# Patient Record
Sex: FEMALE | Race: WHITE | Employment: FULL TIME | URBAN - METROPOLITAN AREA
[De-identification: names, ages, dates, MRNs, and addresses within clinical notes are randomized per-mention and may not be internally consistent; named-entity substitution may affect disease eponyms.]

---

## 2018-04-01 ENCOUNTER — HOSPITAL ENCOUNTER (EMERGENCY)
Facility: HOSPITAL | Age: 73
Discharge: STILL ADMITTED OR ADMINISTRATIVE | End: 2018-04-09

## 2024-03-21 ENCOUNTER — TELEPHONE (OUTPATIENT)
Facility: CLINIC | Age: 79
End: 2024-03-21

## 2024-03-21 ENCOUNTER — OFFICE VISIT (OUTPATIENT)
Facility: CLINIC | Age: 79
End: 2024-03-21
Payer: MEDICARE

## 2024-03-21 VITALS — WEIGHT: 140 LBS | HEIGHT: 61.5 IN | BODY MASS INDEX: 26.09 KG/M2

## 2024-03-21 DIAGNOSIS — G62.9 NEUROPATHY: Primary | ICD-10-CM

## 2024-03-21 PROCEDURE — 99204 OFFICE O/P NEW MOD 45 MIN: CPT | Performed by: NEUROLOGICAL SURGERY

## 2024-03-21 RX ORDER — METOPROLOL SUCCINATE 25 MG/1
25 TABLET, EXTENDED RELEASE ORAL 2 TIMES DAILY
COMMUNITY
Start: 2024-02-05

## 2024-03-21 RX ORDER — ROPINIROLE 1 MG/1
1 TABLET, FILM COATED ORAL EVERY MORNING
COMMUNITY

## 2024-03-21 RX ORDER — ESCITALOPRAM OXALATE 5 MG/1
5 TABLET ORAL DAILY
COMMUNITY
Start: 2024-02-05

## 2024-03-21 NOTE — TELEPHONE ENCOUNTER
Patient brought imaging from [facility] and [facility] to her appointment with Dr. Boles.    09/21/2023 - MRI Lumbar Spine at [facility]  03/10/2020 - MRI Lumbar Spine WOW at [facility]    Films uploaded to PACS.   Discs returned to the patient.

## 2024-03-21 NOTE — PROGRESS NOTES
New patient presents today complaining of  burning, numbness, pin and needles when lying down and during day from knee to calf. They symptoms start at the toes. Patient is using cold pack three times at night for pain relief. Patient states it is hard to sleep.     Any otc pain medication cause asthma    Blood pressure medications - anywhere from hives to sleep    Environmental allergies

## 2024-03-21 NOTE — H&P
CRISTINE COOPER M.D., F.A.A.N.S.     of Neurosurgery  [facility]  Board Certified Neurosurgeon                              [facility] Neurosurgery        [place] [facility]      [address]  [address] [place] [address]    PHONE  [phone]          FAX  [phone]    [email or web address]    [facility], [address], [place]     OFFICE CONSULTATION          Sophia Almanzar  : 10/25/1945   MRN: [id number]    PCP: MARZENNA SCHOENEICH, MD  Referring Provider: No ref. provider found     Insurance: Payor: MEDICARE / Plan: MEDICARE PART B ONLY / Product Type: *No Product type* /            Date of Consult:  3/21/2024    Reason for Consultation:  Our patient has been referred to our office for evaluation of: Bilateral lower extremity tingling and burning      History of Present Illness:  Sophia Almanzar is a a(n) right-handed, 78 year old, female.  This is a pleasant lady with history of lower back pain that has been treated in the past with exercises and multiple epidural steroid injections.  More recently, she started experiencing nightly burning and tingling of her lower extremities.  Reportedly, the sensation starts in her toes and often a sense towards her niece in a circumferential manner symmetrically in the lower extremities.  She denies any weakness or any sensory changes and the sensory perception in her lower extremities is not necessarily related to lower back pain.  She denies any acute bowel or bladder changes.        History:  Past Medical History:   Diagnosis Date    Allergic rhinitis     molds, dust, weed, feather    Essential hypertension     Thyroid disease       Past Surgical History:   Procedure Laterality Date    APPENDECTOMY  2009    CHOLECYSTECTOMY      TONSILLECTOMY Left 1993     Family History   Problem Relation Age of Onset    Ear Problems Other     Cancer Other     Thyroid  disease Other       Social History     Socioeconomic History    Marital status: Single     Spouse name: Not on file    Number of children: Not on file    Years of education: Not on file    Highest education level: Not on file   Occupational History    Not on file   Tobacco Use    Smoking status: Former    Smokeless tobacco: Never   Substance and Sexual Activity    Alcohol use: Yes     Comment: rarely    Drug use: No    Sexual activity: Not on file   Other Topics Concern    Caffeine Concern Not Asked    Exercise Not Asked    Seat Belt Not Asked    Special Diet Not Asked    Stress Concern Not Asked    Weight Concern Not Asked   Social History Narrative    Not on file     Social Determinants of Health     Financial Resource Strain: Not on file   Food Insecurity: Not on file   Transportation Needs: Not on file   Physical Activity: Not on file   Stress: Not on file   Social Connections: Not on file   Housing Stability: Not on file        Allergies:  No Known Allergies    Medications:  Current Outpatient Medications   Medication Sig Dispense Refill    escitalopram 5 MG Oral Tab Take 1 tablet (5 mg total) by mouth daily.      metoprolol succinate ER 25 MG Oral Tablet 24 Hr Take 1 tablet (25 mg total) by mouth 2 (two) times daily.      rOPINIRole 1 MG Oral Tab Take 1 tablet (1 mg total) by mouth every morning.      hydrochlorothiazide 25 MG Oral Tab TK 1 T PO QD  1    HYDROcodone-acetaminophen 5-325 MG Oral Tab TK 1 T PO Q 6 H PRN  0    ROPINIRole HCl 3 MG Oral Tab TK 1 T PO QD  0    Rosuvastatin Calcium 20 MG Oral Tab TK 1 T PO QD HS  0    VALTREX 1 g Oral Tab TK 1 T PO Q 8 H  5    mupirocin 2 % External Ointment Apply to affected area bid x 30 days 1 Tube 0        Review of Systems:  A 10-point system was reviewed.  Pertinent positives and negatives are noted in HPI.      Physical Exam:  Ht 61.5\"   Wt 140 lb (63.5 kg)   BMI 26.02 kg/m²        Neurological Exam:    Motor Strength:  5/5 AMG and symmetric    Sensation to  light touch:  Intact and symmetric in bilateral lower extremities    DTRs:  2+/4 in patellar distribution    Long tract signs:  Negative rosas  Negative babinski  Negative clonus      Abdomen:  Soft, non-distended, non-tender, with no rebound or guarding.  No peritoneal signs.     Extremities:  Non-tender, no lower extremity edema noted.      Labs:  CBC:  No results found for: \"WBC\", \"HGB\", \"HEMOGLOBIN\", \"HCT\", \"MCV\", \"PLT\"   BMG:   No results found for: \"GLUF\", \"NA\", \"K\", \"CO2\", \"CL\", \"BUN\", \"CREATININE\"   INR:   No results found for: \"INR\", \"PROTIME\"     Diagnostics:  I reviewed an MRI of the lumbar spine, dated 21 September, 2023.  There is diffuse advanced degenerative disc disease involving the entire thoracolumbar spine.  There is a rotatory coronal deformity causing multilevel lateral recess narrowing.  However, there is no significant central canal stenosis.     Diagnosis:  1. Neuropathy        Assessment/Plan:  Sophia Almanzar is a a(n) 78 year old, female, presents with bilateral lower extremity burning and tingling which appears to follow a nonradicular pattern.  In addition, there is no radiographic evidence of any severe lumbar stenosis that would endorse a diagnosis of neurogenic claudication.  I would like to obtain flexion-extension films of the lumbar spine to assess for any segmental instability.  In addition, we will obtain an electrophysiologic evaluation of the lower extremities to assess for radiculopathy.  Symptomatically, this appears to be more consistent with a peripheral neuropathy at this point in time.    All questions and concerns were addressed. We appreciate the opportunity to participate in the care of this patient. Please do not hesitate to call our office ([phone]) with any issues.   This report will be submitted to the referring provider.          Jesus Boles M.D., F.A.A.N.S.    3/21/2024  1:20 PM    This note has been dictated utilizing voice recognition software.  Unfortunately, this may lead to occasional typographical errors. If there are any questions regarding this, please do not hesitate to contact our office.

## 2024-03-25 ENCOUNTER — TELEPHONE (OUTPATIENT)
Facility: CLINIC | Age: 79
End: 2024-03-25

## 2024-03-25 ENCOUNTER — TELEPHONE (OUTPATIENT)
Dept: SURGERY | Facility: CLINIC | Age: 79
End: 2024-03-25

## 2024-03-25 NOTE — TELEPHONE ENCOUNTER
Patient brought in her ID cards of the stent information from [facility] eye doctor.  Stent is MRI compatible.  Sent to scan.

## 2024-03-25 NOTE — TELEPHONE ENCOUNTER
Patient brought imaging to her appointment with Dr. Boles.    09/21/2023 - MRI Lumbar SP WO Contrast  03/10/2020 - MRI Lumbar Spine WO Contrast    Films uploaded to PACS and returned to patient.

## 2024-04-02 ENCOUNTER — OFFICE VISIT (OUTPATIENT)
Dept: OTOLARYNGOLOGY | Facility: CLINIC | Age: 79
End: 2024-04-02

## 2024-04-02 VITALS — BODY MASS INDEX: 26.09 KG/M2 | HEIGHT: 61.5 IN | WEIGHT: 140 LBS

## 2024-04-02 DIAGNOSIS — R22.0 BUCCAL MASS: Primary | ICD-10-CM

## 2024-04-02 DIAGNOSIS — H92.03 OTALGIA, BILATERAL: ICD-10-CM

## 2024-04-02 DIAGNOSIS — T16.1XXA FOREIGN BODY OF RIGHT EAR, INITIAL ENCOUNTER: ICD-10-CM

## 2024-04-02 PROCEDURE — 99203 OFFICE O/P NEW LOW 30 MIN: CPT | Performed by: SPECIALIST

## 2024-04-02 PROCEDURE — 69200 CLEAR OUTER EAR CANAL: CPT | Performed by: SPECIALIST

## 2024-04-03 NOTE — PATIENT INSTRUCTIONS
I ordered a ultrasound of the area of your buccal mass.  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  Your cotton swab was removed from your right ear.  You had nasal crusting on your left nasal septum and can use some Neosporin or triple antibiotic ointment.  Try a boppy pillow to keep the pressure off your ears to prevent pain and ulceration of the pinna

## 2024-04-03 NOTE — PROGRESS NOTES
Sophia Almanzar is a 78 year old female.   Chief Complaint   Patient presents with    Mass     Pouch in cheek    Ear Problem     Bumps around ear     HPI:   Patient here to get her cheek and ears checked.    Current Outpatient Medications   Medication Sig Dispense Refill    escitalopram 5 MG Oral Tab Take 1 tablet (5 mg total) by mouth daily.      rOPINIRole 1 MG Oral Tab Take 1 tablet (1 mg total) by mouth every morning.      metoprolol succinate ER 25 MG Oral Tablet 24 Hr Take 1 tablet (25 mg total) by mouth 2 (two) times daily.      hydrochlorothiazide 25 MG Oral Tab TK 1 T PO QD  1    HYDROcodone-acetaminophen 5-325 MG Oral Tab TK 1 T PO Q 6 H PRN  0    ROPINIRole HCl 3 MG Oral Tab TK 1 T PO QD  0    Rosuvastatin Calcium 20 MG Oral Tab TK 1 T PO QD HS  0    VALTREX 1 g Oral Tab TK 1 T PO Q 8 H  5    mupirocin 2 % External Ointment Apply to affected area bid x 30 days 1 Tube 0      Past Medical History:   Diagnosis Date    Allergic rhinitis     molds, dust, weed, feather    Essential hypertension     Thyroid disease       Social History:  Social History     Socioeconomic History    Marital status: Single   Tobacco Use    Smoking status: Former    Smokeless tobacco: Never   Vaping Use    Vaping Use: Never used   Substance and Sexual Activity    Alcohol use: Yes     Comment: rarely    Drug use: No        REVIEW OF SYSTEMS:   GENERAL HEALTH: feels well otherwise  GENERAL : denies fever, chills, sweats, weight loss, weight gain  SKIN: denies any unusual skin lesions or rashes  RESPIRATORY: denies shortness of breath with exertion  NEURO: denies headaches    EXAM:   Ht 5' 1.5\" (1.562 m)   Wt 140 lb (63.5 kg)   BMI 26.02 kg/m²   System Details   Skin Inspection - Normal.   Constitutional Overall appearance - Normal.   Head/Face Facial features - Normal. Eyebrows - Normal. Skull - Normal.   Eyes Conjunctiva - Right: Normal, Left: Normal. Pupil - Right: Normal, Left: Normal.    Ears Inspection - Right: Normal, Left:  Normal.   Canal - Right: Broken cotton swab in the right external auditory canal.  Consent was obtained.  Using a microscope and alligator forceps the cotton swab was removed.  No complications.  Left: Normal.   TM - Right: Normal, Left: Normal.   Nasal External nose - Normal.   Nasal septum -left nasal septum deviation and crusting on the inferior aspect of it.  Turbinates - Normal.   Oral/Oropharynx Lips - Normal, Tonsils - Normal, Tongue - Normal    Neck Exam Inspection - Normal. Palpation - Normal. Parotid gland - Normal. Thyroid gland - Normal.   Lymph Detail Submental. Submandibular. Anterior cervical. Posterior cervical. Supraclavicular all without enlargement   Psychiatric Orientation - Oriented to time, place, person & situation. Appropriate mood and affect.   Neurological Memory - Normal. Cranial nerves - Cranial nerves II through XII grossly intact.     ASSESSMENT AND PLAN:   1. Buccal mass  Will get an ultrasound of the area to better evaluate  - US PAROTID (CPT=76536); Future    2. Foreign body of right ear, initial encounter  Removed.    3. Otalgia, bilateral  Likely from pressure on the pinnae.  Patient asked to try a boppy pillow.  I will of course call patient with all results.      The patient indicates understanding of these issues and agrees to the plan.      Sarah Restrepo MD  4/2/2024  9:25 PM

## 2024-04-04 ENCOUNTER — OFFICE VISIT (OUTPATIENT)
Facility: CLINIC | Age: 79
End: 2024-04-04
Payer: MEDICARE

## 2024-04-04 ENCOUNTER — TELEPHONE (OUTPATIENT)
Dept: OTOLARYNGOLOGY | Facility: CLINIC | Age: 79
End: 2024-04-04

## 2024-04-04 VITALS
HEART RATE: 68 BPM | SYSTOLIC BLOOD PRESSURE: 94 MMHG | WEIGHT: 140 LBS | BODY MASS INDEX: 26.09 KG/M2 | HEIGHT: 61.5 IN | DIASTOLIC BLOOD PRESSURE: 63 MMHG

## 2024-04-04 DIAGNOSIS — G62.9 NEUROPATHY: Primary | ICD-10-CM

## 2024-04-04 PROCEDURE — 99214 OFFICE O/P EST MOD 30 MIN: CPT

## 2024-04-04 RX ORDER — LATANOPROST 50 UG/ML
SOLUTION/ DROPS OPHTHALMIC
COMMUNITY
Start: 2022-03-10

## 2024-04-04 RX ORDER — FLUTICASONE FUROATE AND VILANTEROL 200; 25 UG/1; UG/1
1 POWDER RESPIRATORY (INHALATION) DAILY
COMMUNITY
Start: 2021-12-08

## 2024-04-04 RX ORDER — CIPROFLOXACIN HYDROCHLORIDE 3.5 MG/ML
1 SOLUTION/ DROPS TOPICAL 4 TIMES DAILY
COMMUNITY
Start: 2022-10-18

## 2024-04-04 RX ORDER — ALPRAZOLAM 0.25 MG/1
0.25 TABLET ORAL
COMMUNITY
Start: 2022-02-21

## 2024-04-04 RX ORDER — LEVALBUTEROL TARTRATE 45 UG/1
2 AEROSOL, METERED ORAL EVERY 6 HOURS
COMMUNITY
Start: 2022-01-12

## 2024-04-04 RX ORDER — PREDNISOLONE ACETATE 10 MG/ML
SUSPENSION/ DROPS OPHTHALMIC
COMMUNITY

## 2024-04-04 NOTE — TELEPHONE ENCOUNTER
Attempted to reach patient, LMTCB.      Order for ultrasound printed and placed at  for patient to , will also have patient bring back order that was given to her for wrong patient. Will attempt to call patient back,    MARÍA Bautista

## 2024-04-04 NOTE — TELEPHONE ENCOUNTER
Patient calling stating she was given the  wrong order for the ultrasound the order she has is from a different patient. Please advise

## 2024-04-04 NOTE — PROGRESS NOTES
[facility] Neurosurgery        [facility]      [address]  [address]  [place] [address]    PHONE  [phone]          FAX  [phone]    [email or web address]      OFFICE FOLLOW-UP NOTE            Sophia Almanzar    : 10/25/1945    MRN: [id number]  CSN: [id number]      PCP: MARZENNA SCHOENEICH, MD  Referring Provider: No ref. provider found    Insurance: Payor: MEDICARE / Plan: MEDICARE PART B ONLY / Product Type: *No Product type* /           Date of Visit: 2024    Reason for Visit:   Chief Complaint    Follow - Up                         History of Present Care:  Sophia Almanzar is a a(n) 78 year old, female who returns to the office for follow-up.  She has a history of low back pain was treated in the past with multiple epidural steroid injections.  She was seen approximately 2 weeks ago by Dr. Boles for burning and tingling at night in the toes which seems to radiate proximally.  She continues to have no changes in her bowel or bladder or saddle anesthesia.    History:  Past Medical History:   Diagnosis Date    Allergic rhinitis     molds, dust, weed, feather    Essential hypertension     Thyroid disease       Past Surgical History:   Procedure Laterality Date    APPENDECTOMY  2009    CHOLECYSTECTOMY      TONSILLECTOMY Left 1993      Family History   Problem Relation Age of Onset    Ear Problems Other     Cancer Other     Thyroid disease Other       Social History     Socioeconomic History    Marital status: Single     Spouse name: Not on file    Number of children: Not on file    Years of education: Not on file    Highest education level: Not on file   Occupational History    Not on file   Tobacco Use    Smoking status: Former    Smokeless tobacco: Never   Vaping Use    Vaping Use: Never used   Substance and Sexual Activity    Alcohol use: Yes     Comment: rarely    Drug use: No    Sexual activity: Not on file    Other Topics Concern    Caffeine Concern Not Asked    Exercise Not Asked    Seat Belt Not Asked    Special Diet Not Asked    Stress Concern Not Asked    Weight Concern Not Asked   Social History Narrative    Not on file     Social Determinants of Health     Financial Resource Strain: Not on file   Food Insecurity: Not on file   Transportation Needs: Not on file   Physical Activity: Not on file   Stress: Not on file   Social Connections: Not on file   Housing Stability: Not on file        Allergies:  Allergies   Allergen Reactions    Ace Inhibitors Coughing and WHEEZING     Wheezing, sob, headaches, cough, insomnia    Amlodipine SWELLING    Diltiazem SWELLING    Naproxen WHEEZING     Right side of neck swelling         Medications:  Current Outpatient Medications   Medication Sig Dispense Refill    ALPRAZolam 0.25 MG Oral Tab Take 1 tablet (0.25 mg total) by mouth daily as needed.      ciprofloxacin 0.3 % Ophthalmic Solution Place 1 drop into the right eye 4 (four) times daily.      fluticasone furoate-vilanterol (BREO ELLIPTA) 200-25 MCG/ACT Inhalation Aerosol Powder, Breath Activated Inhale 1 puff into the lungs daily.      latanoprost 0.005 % Ophthalmic Solution INSTILL 1 DROP IN BOTH EYES EVERY EVENING. SEPARATE BY AT LEAST 10 MINUTES FROM OTHER INTRACULAR PRESSURE REDUCINGOPHTHALMIC DRUGS      Levalbuterol Tartrate 45 MCG/ACT Inhalation Aerosol Inhale 2 puffs into the lungs every 6 (six) hours.      prednisoLONE 1 % Ophthalmic Suspension INSTILL 1 DROP IN BOTH EYES FOUR TIMES DAILY FOR 4 DAYS AFTER SURGERY      escitalopram 5 MG Oral Tab Take 1 tablet (5 mg total) by mouth daily.      rOPINIRole 1 MG Oral Tab Take 1 tablet (1 mg total) by mouth every morning.      metoprolol succinate ER 25 MG Oral Tablet 24 Hr Take 1 tablet (25 mg total) by mouth 2 (two) times daily.      hydrochlorothiazide 25 MG Oral Tab TK 1 T PO QD  1    HYDROcodone-acetaminophen 5-325 MG Oral Tab TK 1 T PO Q 6 H PRN  0    ROPINIRole HCl  3 MG Oral Tab TK 1 T PO QD  0    Rosuvastatin Calcium 20 MG Oral Tab TK 1 T PO QD HS  0    VALTREX 1 g Oral Tab TK 1 T PO Q 8 H  5    mupirocin 2 % External Ointment Apply to affected area bid x 30 days 1 Tube 0        Review of Systems:  A 10-point system was reviewed.  Pertinent positives and negatives are noted in HPI.      Physical Exam:  BP 94/63 (BP Location: Left arm, Patient Position: Sitting, Cuff Size: adult)   Pulse 68   Ht 61.5\"   Wt 140 lb (63.5 kg)   BMI 26.02 kg/m²         Neurological Exam:    AAOx3, following commands  PERRLA  EOMI  Face symmetrical  Tongue midline  Hearing symmetrical and intact to finger rub    No rhinorrhea or otorrhea    Romberg negative    Motor Strength:  5 out of 5 in bilateral lower extremities    Sensation:  Intact in bilateral lower extremities throughout    DTRs:  2+ out of 4 patellar and Achilles and symmetrical      Abdomen:  Soft, non-distended, non-tender, with no rebound or guarding.  No peritoneal signs.     Extremities:  Non-tender, no lower extremity edema noted.      Labs:  CBC:  No results found for: \"WBC\", \"HGB\", \"HEMOGLOBIN\", \"HCT\", \"MCV\", \"PLT\"   BMG:  No results found for: \"GLUF\", \"NA\", \"K\", \"CO2\", \"CL\", \"BUN\", \"CREATININE\"   INR:  No results found for: \"INR\", \"PROTIME\"       Diagnostics:  No new imaging to review    Diagnosis:  1. Neuropathy      Assessment/Plan:  Sophia Almanzar returns to the office for follow-up.  She was recently seen and ordered to have lumbar flexion-extension x-rays as well as an EMG of the lower extremities to evaluate for neuropathy versus lumbar radiculopathy.  She is now ready to have those studies completed and I have suggested that she schedule with central scheduling.  Will then plan to see patient back in the office for review of the studies.    More than 30 minutes were spent during this visit and the coordination of this patient's care. All questions and concerns were addressed. We appreciate the opportunity to  participate in the care of this patient. Please do not hesitate to call our office ([phone]) with any issues.    This note has been dictated utilizing voice recognition software. Unfortunately, this may lead to occasional typographical errors. If there are any questions regarding this, please do not hesitate to contact our office.       Santo Steele PA-C    4/4/2024  3:20 PM

## 2024-04-05 NOTE — TELEPHONE ENCOUNTER
Notified patient that we will be sending order in the mail due to her not being able to pick it up. Patient verbalized understanding.

## 2024-04-08 NOTE — TELEPHONE ENCOUNTER
Contacted patient and will bring back ultrasound order with incorrect name to our office as soon as she is able so we can shred this paperwork. Confirmed that correct new order was mailed 4/5/2024. Verbalized understanding.

## 2024-05-09 ENCOUNTER — OFFICE VISIT (OUTPATIENT)
Facility: CLINIC | Age: 79
End: 2024-05-09
Payer: MEDICARE

## 2024-05-09 ENCOUNTER — HOSPITAL ENCOUNTER (OUTPATIENT)
Dept: GENERAL RADIOLOGY | Age: 79
Discharge: HOME | End: 2024-05-09
Payer: MEDICARE

## 2024-05-09 VITALS
SYSTOLIC BLOOD PRESSURE: 144 MMHG | DIASTOLIC BLOOD PRESSURE: 78 MMHG | HEIGHT: 61.5 IN | WEIGHT: 140 LBS | HEART RATE: 68 BPM | BODY MASS INDEX: 26.09 KG/M2

## 2024-05-09 DIAGNOSIS — R20.2 PARESTHESIA OF BOTH LOWER EXTREMITIES: Primary | ICD-10-CM

## 2024-05-09 DIAGNOSIS — R20.2 PARESTHESIAS: ICD-10-CM

## 2024-05-09 PROCEDURE — 72120 X-RAY BEND ONLY L-S SPINE: CPT

## 2024-05-09 PROCEDURE — 99213 OFFICE O/P EST LOW 20 MIN: CPT | Performed by: NEUROLOGICAL SURGERY

## 2024-05-09 NOTE — PROGRESS NOTES
CRISTINE COOPER M.D., F.A.A.N.S.     of Neurosurgery  [facility]  Board Certified Neurosurgeon                          [facility], [facility], [facility]        [address]  [address] [place] [address]    PHONE  [phone]          FAX  [phone]    [email or web address]      OFFICE FOLLOW-UP NOTE      Sophia Almanzar    : 10/25/1945    MRN: [id number]  CSN: [id number]      PCP: MARZENNA SCHOENEICH, MD  Referring Provider: No ref. provider found    Insurance: Payor: MEDICARE / Plan: MEDICARE PART B ONLY / Product Type: *No Product type* /     Date of Visit: 2024    Reason for Visit:   Chief Complaint    Follow - Up                         History of Present Care:  Sophia Almanzar is a a(n) 78 year old, female. Sophia Almanzar is a a(n) right-handed, 78 year old, female.  This is a pleasant lady with history of lower back pain that has been treated in the past with exercises and multiple epidural steroid injections.  More recently, she started experiencing nightly burning and tingling of her lower extremities.  Reportedly, the sensation starts in her toes and often a sense towards her niece in a circumferential manner symmetrically in the lower extremities.  She denies any weakness or any sensory changes and the sensory perception in her lower extremities is not necessarily related to lower back pain.  She denies any acute bowel or bladder changes.         History:  .  Past Medical History:    Allergic rhinitis    molds, dust, weed, feather    Essential hypertension    Thyroid disease      Past Surgical History:   Procedure Laterality Date    Appendectomy  2009    Cholecystectomy      Tonsillectomy Left 1993      Family History   Problem Relation Age of Onset    Ear Problems Other     Cancer Other     Thyroid disease Other       Social History      Socioeconomic History    Marital status: Single     Spouse name: Not on file    Number of children: Not on file    Years of education: Not on file    Highest education level: Not on file   Occupational History    Not on file   Tobacco Use    Smoking status: Former    Smokeless tobacco: Never   Vaping Use    Vaping status: Never Used   Substance and Sexual Activity    Alcohol use: Yes     Comment: rarely    Drug use: No    Sexual activity: Not on file   Other Topics Concern    Caffeine Concern Not Asked    Exercise Not Asked    Seat Belt Not Asked    Special Diet Not Asked    Stress Concern Not Asked    Weight Concern Not Asked   Social History Narrative    Not on file     Social Determinants of Health     Financial Resource Strain: Not on file   Food Insecurity: Not on file   Transportation Needs: Not on file   Physical Activity: Not on file   Stress: Not on file   Social Connections: Not on file   Housing Stability: Not on file        Allergies:  Allergies   Allergen Reactions    Ace Inhibitors Coughing and WHEEZING     Wheezing, sob, headaches, cough, insomnia    Amlodipine SWELLING    Diltiazem SWELLING    Naproxen WHEEZING     Right side of neck swelling         Medications:  Current Outpatient Medications   Medication Sig Dispense Refill    ALPRAZolam 0.25 MG Oral Tab Take 1 tablet (0.25 mg total) by mouth daily as needed.      ciprofloxacin 0.3 % Ophthalmic Solution Place 1 drop into the right eye 4 (four) times daily.      fluticasone furoate-vilanterol (BREO ELLIPTA) 200-25 MCG/ACT Inhalation Aerosol Powder, Breath Activated Inhale 1 puff into the lungs daily.      latanoprost 0.005 % Ophthalmic Solution INSTILL 1 DROP IN BOTH EYES EVERY EVENING. SEPARATE BY AT LEAST 10 MINUTES FROM OTHER INTRACULAR PRESSURE REDUCINGOPHTHALMIC DRUGS      Levalbuterol Tartrate 45 MCG/ACT Inhalation Aerosol Inhale 2 puffs into the lungs every 6 (six) hours.      prednisoLONE 1 % Ophthalmic Suspension INSTILL 1 DROP IN BOTH  EYES FOUR TIMES DAILY FOR 4 DAYS AFTER SURGERY      escitalopram 5 MG Oral Tab Take 1 tablet (5 mg total) by mouth daily.      rOPINIRole 1 MG Oral Tab Take 1 tablet (1 mg total) by mouth every morning.      metoprolol succinate ER 25 MG Oral Tablet 24 Hr Take 1 tablet (25 mg total) by mouth 2 (two) times daily.      hydrochlorothiazide 25 MG Oral Tab TK 1 T PO QD  1    HYDROcodone-acetaminophen 5-325 MG Oral Tab TK 1 T PO Q 6 H PRN  0    ROPINIRole HCl 3 MG Oral Tab TK 1 T PO QD  0    Rosuvastatin Calcium 20 MG Oral Tab TK 1 T PO QD HS  0    VALTREX 1 g Oral Tab TK 1 T PO Q 8 H  5    mupirocin 2 % External Ointment Apply to affected area bid x 30 days 1 Tube 0        Review of Systems:  A 10-point system was reviewed.  Pertinent positives and negatives are noted in HPI.      Physical Exam:  /78 (BP Location: Left arm, Patient Position: Sitting, Cuff Size: adult)   Pulse 68   Ht 61.5\"   Wt 140 lb (63.5 kg)   BMI 26.02 kg/m²         Neurological Exam:    AAOx3, following commands  PERRLA  EOMI  Face symmetrical  Tongue midline  Hearing symmetrical and intact to finger rub    No rhinorrhea or otorrhea    Romberg negative    Motor Strength:  Symmetric in the lower extremities    Sensation to light touch:  Bilaterally symmetrical in the lower extremities    Incision:  No incision    Abdomen:  Soft, non-distended, non-tender, with no rebound or guarding.  No peritoneal signs.     Extremities:  Non-tender, no lower extremity edema noted.      Labs:  CBC:  No results found for: \"WBC\", \"HGB\", \"HEMOGLOBIN\", \"HCT\", \"MCV\", \"PLT\"   BMG:  No results found for: \"GLUF\", \"NA\", \"K\", \"CO2\", \"CL\", \"BUN\", \"CREATININE\"   INR:  No results found for: \"INR\", \"PROTIME\"       Diagnostics:  No new images to review    Diagnosis:  1. Paresthesia of both lower extremities  - EMG; Future  - X-RAY LUMBAR SPINE FLEX/EXTEN; Future      Assessment/Plan:  Our patient has not yet obtained the diagnostic studies with ordered at the last  encounter.  Will obtain flexion-extension films of the lumbar spine to assess for any dynamic pathology.  Furthermore, would like to evaluate her lower extremities for radiculopathy versus peripheral neuropathy to see if there is a surgical lesion that I can be helpful with.    More than 30 minutes were spent during this visit and the coordination of this patient's care. All questions and concerns were addressed. We appreciate the opportunity to participate in the care of this patient. Please do not hesitate to call our office ([phone]) with any issues.        Jesus Boles M.D., F.A.A.N.S.    5/9/2024  12:01 PM    This note has been dictated utilizing voice recognition software. Unfortunately, this may lead to occasional typographical errors. If there are any questions regarding this, please do not hesitate to contact our office.

## 2024-06-05 ENCOUNTER — HOSPITAL ENCOUNTER (OUTPATIENT)
Dept: ULTRASOUND IMAGING | Facility: HOSPITAL | Age: 79
Discharge: HOME | End: 2024-06-05
Attending: SPECIALIST
Payer: MEDICARE

## 2024-06-05 ENCOUNTER — TELEPHONE (OUTPATIENT)
Dept: OTOLARYNGOLOGY | Facility: CLINIC | Age: 79
End: 2024-06-05

## 2024-06-05 DIAGNOSIS — R22.0 BUCCAL MASS: Primary | ICD-10-CM

## 2024-06-05 DIAGNOSIS — R22.0 BUCCAL MASS: ICD-10-CM

## 2024-06-05 PROCEDURE — 76536 US EXAM OF HEAD AND NECK: CPT | Performed by: SPECIALIST

## 2024-06-05 NOTE — TELEPHONE ENCOUNTER
Left a message for the patient.  The ultrasound wasn't helpful.  I will get a CT of the neck to better characterize the mass.

## 2024-06-12 ENCOUNTER — HOSPITAL ENCOUNTER (OUTPATIENT)
Dept: CT IMAGING | Facility: HOSPITAL | Age: 79
Discharge: HOME | End: 2024-06-12
Attending: SPECIALIST
Payer: MEDICARE

## 2024-06-12 DIAGNOSIS — R22.0 BUCCAL MASS: ICD-10-CM

## 2024-06-12 LAB
CREAT BLD-MCNC: 0.7 MG/DL
EGFRCR SERPLBLD CKD-EPI 2021: 89 ML/MIN/1.73M2 (ref 60–?)

## 2024-06-12 PROCEDURE — 82565 ASSAY OF CREATININE: CPT

## 2024-06-12 PROCEDURE — 70491 CT SOFT TISSUE NECK W/DYE: CPT | Performed by: SPECIALIST

## 2024-06-13 ENCOUNTER — TELEPHONE (OUTPATIENT)
Dept: OTOLARYNGOLOGY | Facility: CLINIC | Age: 79
End: 2024-06-13

## 2024-06-13 NOTE — TELEPHONE ENCOUNTER
Per patient received CT results call from Dr. Restrepo, was told to make follow up appointment to discuss further, patient scheduled next available on 7/2 at [facility], patient asking if ok to wait that long? States most convenient location is [facility]. Please advise thank you.

## 2024-06-13 NOTE — PROGRESS NOTES
I left a message for the patient.  There looks to be a left vocal cord paralysis, possibly secondary to the past thyroid surgery.  There is also a possible left subclavian steal syndrome.  I would ask you to comment and possibly work this up if needed.  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  The CT scan is a little hard to see in this area secondary to dental artifact.  I did leave a message for the patient asking her to follow-up with me.

## 2024-06-13 NOTE — TELEPHONE ENCOUNTER
Yes, there isn't anything acute.  I have also sent the information to Dr. Gonzalez. The patient may want to reach out to her as well.  I would like to recheck her on 7/2.

## 2024-06-13 NOTE — TELEPHONE ENCOUNTER
Dr. Restrepo, patient is to follow up with you and discuss CT results, she prefers the [facility] office and the next appt there is 7/2/24, is that ok or do you need to see her sooner?

## 2024-06-14 NOTE — TELEPHONE ENCOUNTER
Called Sophia to let her know the following info from Dr. Restrepo:    It's ok to see patient on 7/2/24, there's nothing acute. She also sent the information to Dr. Gonzalez PCP. The patient may want to reach out to her as well. She would like to recheck Sophia on 7/2.     Sophia understood.

## 2024-07-02 ENCOUNTER — OFFICE VISIT (OUTPATIENT)
Dept: OTOLARYNGOLOGY | Facility: CLINIC | Age: 79
End: 2024-07-02

## 2024-07-02 VITALS — BODY MASS INDEX: 26.09 KG/M2 | WEIGHT: 140 LBS | HEIGHT: 61.5 IN

## 2024-07-02 DIAGNOSIS — J38.01 UNILATERAL VOCAL CORD PARALYSIS: ICD-10-CM

## 2024-07-02 DIAGNOSIS — E04.2 MULTIPLE THYROID NODULES: Primary | ICD-10-CM

## 2024-07-02 DIAGNOSIS — R22.0 BUCCAL MASS: ICD-10-CM

## 2024-07-02 DIAGNOSIS — G45.8 SUBCLAVIAN STEAL SYNDROME: ICD-10-CM

## 2024-07-02 PROCEDURE — 31575 DIAGNOSTIC LARYNGOSCOPY: CPT | Performed by: SPECIALIST

## 2024-07-02 PROCEDURE — 99214 OFFICE O/P EST MOD 30 MIN: CPT | Performed by: SPECIALIST

## 2024-07-03 NOTE — PATIENT INSTRUCTIONS
You have a left vocal cord paralysis.  A repeat thyroid ultrasound was ordered to check on the stability of your right vocal cord nodules.  Please get me a copy of the old disc so I can upload that into the system.  We reviewed the CT scan which shows there just to be some asymmetric fat but no other abnormalities of your buccal area.

## 2024-07-03 NOTE — PROGRESS NOTES
Sophia Almanzar is a 78 year old female.   Chief Complaint   Patient presents with    Results     Discuss results     HPI:   Patient here to discuss her CT scan results.    Current Outpatient Medications   Medication Sig Dispense Refill    ALPRAZolam 0.25 MG Oral Tab Take 1 tablet (0.25 mg total) by mouth daily as needed.      ciprofloxacin 0.3 % Ophthalmic Solution Place 1 drop into the right eye 4 (four) times daily.      fluticasone furoate-vilanterol (BREO ELLIPTA) 200-25 MCG/ACT Inhalation Aerosol Powder, Breath Activated Inhale 1 puff into the lungs daily.      latanoprost 0.005 % Ophthalmic Solution INSTILL 1 DROP IN BOTH EYES EVERY EVENING. SEPARATE BY AT LEAST 10 MINUTES FROM OTHER INTRACULAR PRESSURE REDUCINGOPHTHALMIC DRUGS      Levalbuterol Tartrate 45 MCG/ACT Inhalation Aerosol Inhale 2 puffs into the lungs every 6 (six) hours.      prednisoLONE 1 % Ophthalmic Suspension INSTILL 1 DROP IN BOTH EYES FOUR TIMES DAILY FOR 4 DAYS AFTER SURGERY      escitalopram 5 MG Oral Tab Take 1 tablet (5 mg total) by mouth daily.      rOPINIRole 1 MG Oral Tab Take 1 tablet (1 mg total) by mouth every morning.      metoprolol succinate ER 25 MG Oral Tablet 24 Hr Take 1 tablet (25 mg total) by mouth 2 (two) times daily.      hydrochlorothiazide 25 MG Oral Tab TK 1 T PO QD  1    HYDROcodone-acetaminophen 5-325 MG Oral Tab TK 1 T PO Q 6 H PRN  0    ROPINIRole HCl 3 MG Oral Tab TK 1 T PO QD  0    Rosuvastatin Calcium 20 MG Oral Tab TK 1 T PO QD HS  0    VALTREX 1 g Oral Tab TK 1 T PO Q 8 H  5    mupirocin 2 % External Ointment Apply to affected area bid x 30 days 1 Tube 0      Past Medical History:    Allergic rhinitis    molds, dust, weed, feather    Essential hypertension    Thyroid disease      Social History:  Social History     Socioeconomic History    Marital status:    Tobacco Use    Smoking status: Former    Smokeless tobacco: Never   Vaping Use    Vaping status: Never Used   Substance and Sexual Activity     Alcohol use: Yes     Comment: rarely    Drug use: No        REVIEW OF SYSTEMS:   GENERAL HEALTH: feels well otherwise  GENERAL : denies fever, chills, sweats, weight loss, weight gain  SKIN: denies any unusual skin lesions or rashes  RESPIRATORY: denies shortness of breath with exertion  NEURO: denies headaches    EXAM:   Ht 5' 1.5\" (1.562 m)   Wt 140 lb (63.5 kg)   BMI 26.02 kg/m²   System Details   Skin Inspection - Normal.   Constitutional Overall appearance - Normal.   Head/Face Facial features - Normal. Eyebrows - Normal. Skull - Normal..  Some asymmetry of the right buccal versus left buccal area.   Eyes Conjunctiva - Right: Normal, Left: Normal. Pupil - Right: Normal, Left: Normal.    Ears Inspection - Right: Normal, Left: Normal.   Canal - Right: Normal, Left: Normal.    TM - Right: Normal, Left: Normal.   Nasal External nose - Normal.   Nasal septum - Normal.  Turbinates - Normal   Oral/Oropharynx Lips - Normal, Tonsils - Normal, Tongue - Normal    Neck Exam Inspection - Normal. Palpation - Normal. Parotid gland - Normal. Thyroid gland -no palpable thyroid nodules   Lymph Detail Submental. Submandibular. Anterior cervical. Posterior cervical. Supraclavicular.  All without enlargement   Psychiatric Orientation - Oriented to time, place, person & situation. Appropriate mood and affect.   Neurological Memory - Normal. Cranial nerves - Cranial nerves II through XII grossly intact.   Nasopharynx Normal by fiberoptic exam   Larynx Consent was obtained for the procedure.  The nose was asesthetized with 1% neosynephrine and 4% lidocaine topical drops.    The scope was placed into the bilateral nares as well as the nasopharynx, hypopharynx and larynx.    All of which were examined.  The  entire larynx including the vallecula, epiglottis, true and false vocal cords, aryepiglottic folds, piriform sinuses and post cricord area were examined for tumors and infectious processes as well as for evidence of reflux and  retained secretions.  Vocal cord mobility was also assessed.    Any abnormalities are noted in the exam section.  Left true vocal cord paralysis.  Some compensation by the right true vocal cord.  Contain secretions.  No tumors or masses seen.     ASSESSMENT AND PLAN:   1. Multiple thyroid nodules  As last thyroid ultrasound was more than a year ago, would recommend repeat thyroid ultrasound.  Dominant right thyroid nodule had a fine-needle aspirate done which was negative a couple years ago.  Past thyroid ultrasound as well as fine-needle aspirate reviewed with the patient  - US THYROID (CPT=76536); Future    2. Unilateral vocal cord paralysis  On the left    3. Buccal mass  Reviewed actual CT scan with the patient and this just showed some asymmetry of the fat but no other abnormalities.  I believe she is biting her buccal area due to some missing teeth in the back on the right.    4.  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 Gonzalez.      The patient indicates understanding of these issues and agrees to the plan.      Sarah Restrepo MD  7/2/2024  7:53 PM

## 2024-07-29 ENCOUNTER — PROCEDURE VISIT (OUTPATIENT)
Dept: PHYSICAL MEDICINE AND REHAB | Facility: CLINIC | Age: 79
End: 2024-07-29
Payer: MEDICARE

## 2024-07-29 DIAGNOSIS — R20.2 PARESTHESIAS: Primary | ICD-10-CM

## 2024-07-29 NOTE — PROGRESS NOTES
[facility]  Electromyography Consultation      History of Present Illness:    Dear Dr. Boles,  Thank you for the opportunity to see Sophia Almanzar for electrodiagnostic consultation today. As you know the patient is a 78 year old female with a chief complaint of symmetric paresthesias from the knees down.  She also has chronic low back pain.    PAST MEDICAL HISTORY:  Past Medical History:    Allergic rhinitis    molds, dust, weed, feather    Essential hypertension    Thyroid disease       SURGICAL HISTORY:  Past Surgical History:   Procedure Laterality Date    Appendectomy  09/2009    Cholecystectomy      Tonsillectomy Left 03/1993       SOCIAL HISTORY:   Social History     Occupational History    Not on file   Tobacco Use    Smoking status: Former    Smokeless tobacco: Never   Vaping Use    Vaping status: Never Used   Substance and Sexual Activity    Alcohol use: Yes     Comment: rarely    Drug use: No    Sexual activity: Not on file       FAMILY HISTORY:   Family History   Problem Relation Age of Onset    Ear Problems Other     Cancer Other     Thyroid disease Other        CURRENT MEDICATIONS:   Current Outpatient Medications   Medication Sig Dispense Refill    ALPRAZolam 0.25 MG Oral Tab Take 1 tablet (0.25 mg total) by mouth daily as needed.      ciprofloxacin 0.3 % Ophthalmic Solution Place 1 drop into the right eye 4 (four) times daily.      fluticasone furoate-vilanterol (BREO ELLIPTA) 200-25 MCG/ACT Inhalation Aerosol Powder, Breath Activated Inhale 1 puff into the lungs daily.      latanoprost 0.005 % Ophthalmic Solution INSTILL 1 DROP IN BOTH EYES EVERY EVENING. SEPARATE BY AT LEAST 10 MINUTES FROM OTHER INTRACULAR PRESSURE REDUCINGOPHTHALMIC DRUGS      Levalbuterol Tartrate 45 MCG/ACT Inhalation Aerosol Inhale 2 puffs into the lungs every 6 (six) hours.      prednisoLONE 1 % Ophthalmic Suspension INSTILL 1 DROP IN BOTH EYES FOUR TIMES DAILY FOR 4 DAYS AFTER  SURGERY      escitalopram 5 MG Oral Tab Take 1 tablet (5 mg total) by mouth daily.      rOPINIRole 1 MG Oral Tab Take 1 tablet (1 mg total) by mouth every morning.      metoprolol succinate ER 25 MG Oral Tablet 24 Hr Take 1 tablet (25 mg total) by mouth 2 (two) times daily.      hydrochlorothiazide 25 MG Oral Tab TK 1 T PO QD  1    HYDROcodone-acetaminophen 5-325 MG Oral Tab TK 1 T PO Q 6 H PRN  0    ROPINIRole HCl 3 MG Oral Tab TK 1 T PO QD  0    Rosuvastatin Calcium 20 MG Oral Tab TK 1 T PO QD HS  0    VALTREX 1 g Oral Tab TK 1 T PO Q 8 H  5    mupirocin 2 % External Ointment Apply to affected area bid x 30 days 1 Tube 0       PHYSICAL EXAM:   There were no vitals taken for this visit.    There is no height or weight on file to calculate BMI.      General: No immediate distress   Extremities: peripheral pulses intact, no lower extremity edema bilaterally   Skin: No lesions noted.   Neuro:   Strength: Lower extremities have 5/5 strength  Muscle bulk: No atrophy  Sensation: Normal lower extremities  SLR: Negative bilaterally    EMG/NCV  Sensory Nerve Conduction Study       Nerve / Sites Peak Lat Amp Segments Distance    ms µV  cm   R Sural - (Antidromic)      Calf 3.4 10.4 Calf - Ankle 14   L Sural - (Antidromic)      Calf 4.1 9.6 Calf - Ankle 14       Motor Nerve Conduction Study       Nerve / Sites Latency Amplitude Rel Amp Dur. Dur. Segments Distance Velocity Area Area    ms mV % ms %  cm m/s mVms %   L Peroneal - EDB      Ankle 3.8 2.2 100 5.1 100 Ankle - EDB 8  5.5 100      B. Fib Head 9.7 2.0 91.3 4.2 82 B. Fib Head - Ankle 26 44 4.2 76.9   R Peroneal - EDB      Ankle 3.4 2.6 100 5.8 100 Ankle - EDB 8  7.3 100      B. Fib Head 9.6 2.5 99.4 6.5 112 B. Fib Head - Ankle 27.5 45 7.7 105       EMG Summary Table     Spontaneous MUAP Recruitment   Muscle IA Fib PSW Fasc Comments Amp Dur. PPP Pattern   R. Vastus lateralis N None None None None N N N N   R. Tibialis anterior N None None None None N N N N   R. Peroneus  longus N None None None None N N N N   R. Gastrocnemius (Medial head) N None None None None N N N N   R. Dorsal interossei (pedis) N None None None None N N N N   L. Vastus lateralis N None None None None N N N N   L. Tibialis anterior N None None None None N N N N   L. Peroneus longus N None None None None N N N N   L. Gastrocnemius (Medial head) N None None None None N N N N   L. Dorsal interossei (pedis) N None None None None N N N N               Findings: Extremities were warmed with hot packs for 15 minutes prior to testing.  Sensory nerve conduction studies revealed normal and bilaterally symmetric sural sensory responses.  Motor nerve conduction studies revealed normal and bilaterally symmetric common peroneal motor responses to the EDB.  Needle EMG of the bilateral lower extremities was normal in all muscles tested.  Please refer to EMG summary table above.  Impression:  1.  Normal study.  2.  No electrodiagnostic evidence of lumbar radiculopathy bilaterally.  3.  No electrodiagnostic evidence of peripheral polyneuropathy.      ASSESSMENT AND PLAN:  1. Paresthesias  The patient's EMG is negative        Thank you for the opportunity to participate in the care of this patient.  Sincerely,    Dane Bui M.D.  Diplomate American Board of Physical Medicine and Rehabilitation

## 2024-09-13 ENCOUNTER — HOSPITAL ENCOUNTER (OUTPATIENT)
Dept: ULTRASOUND IMAGING | Age: 79
Discharge: HOME | End: 2024-09-13
Attending: SPECIALIST
Payer: MEDICARE

## 2024-09-13 ENCOUNTER — TELEPHONE (OUTPATIENT)
Dept: OTOLARYNGOLOGY | Facility: CLINIC | Age: 79
End: 2024-09-13

## 2024-09-13 DIAGNOSIS — E04.2 MULTIPLE THYROID NODULES: ICD-10-CM

## 2024-09-13 PROCEDURE — 76536 US EXAM OF HEAD AND NECK: CPT | Performed by: SPECIALIST

## 2024-09-13 NOTE — PROGRESS NOTES
For your review.  Would recommend FNA if not already done.  I cannot find any documentation of this in care everywhere.

## 2024-09-14 ENCOUNTER — TELEPHONE (OUTPATIENT)
Dept: OTOLARYNGOLOGY | Facility: CLINIC | Age: 79
End: 2024-09-14

## 2024-09-19 ENCOUNTER — OFFICE VISIT (OUTPATIENT)
Facility: CLINIC | Age: 79
End: 2024-09-19
Payer: MEDICARE

## 2024-09-19 DIAGNOSIS — R20.2 PARESTHESIA OF BOTH LOWER EXTREMITIES: Primary | ICD-10-CM

## 2024-09-19 PROCEDURE — 99214 OFFICE O/P EST MOD 30 MIN: CPT | Performed by: NEUROLOGICAL SURGERY

## 2024-09-19 NOTE — PROGRESS NOTES
CRISTINE COOPER M.D., F.A.A.N.S.     of Neurosurgery  [facility]  Board Certified Neurosurgeon                          [facility], [facility], [facility]        [address]  [address] [place] [address]    PHONE  [phone]          FAX  [phone]    [email or web address]      OFFICE FOLLOW-UP NOTE      Sophia Almanzar    : 10/25/1945    MRN: [id number]  CSN: [id number]      PCP: SOPHIA DICKERSON MD  Referring Provider: No ref. provider found    Insurance: Payor: MEDICARE / Plan: MEDICARE PART B ONLY / Product Type: *No Product type* /     Date of Visit: 2024    Reason for Visit:   Chief Complaint    Follow - Up                         History of Present Care:  Sophia Almanzar is a a(n) 78 year old, female.  Our patient is following up today after recent electrophysiologic evaluation as well as dynamic imaging of the lumbar spine.  She has mechanical lower back pain with intermittent lower extremity symptoms associated with severe multilevel degenerative disc disease.  There was no evidence of any significant stenosis that would have made me recommend surgery at our last setting.  She is following up today.      History:  .  Past Medical History:    Allergic rhinitis    molds, dust, weed, feather    Essential hypertension    Thyroid disease      Past Surgical History:   Procedure Laterality Date    Appendectomy  2009    Cholecystectomy      Tonsillectomy Left 1993      Family History   Problem Relation Age of Onset    Ear Problems Other     Cancer Other     Thyroid disease Other       Social History     Socioeconomic History    Marital status:      Spouse name: Not on file    Number of children: Not on file    Years of education: Not on file    Highest education level: Not on file   Occupational History    Not on file   Tobacco Use     Smoking status: Former    Smokeless tobacco: Never   Vaping Use    Vaping status: Never Used   Substance and Sexual Activity    Alcohol use: Yes     Comment: rarely    Drug use: No    Sexual activity: Not on file   Other Topics Concern    Caffeine Concern Not Asked    Exercise Not Asked    Seat Belt Not Asked    Special Diet Not Asked    Stress Concern Not Asked    Weight Concern Not Asked   Social History Narrative    Not on file     Social Determinants of Health     Financial Resource Strain: Not on file   Food Insecurity: Not on file   Transportation Needs: Not on file   Physical Activity: Not on file   Stress: Not on file   Social Connections: Not on file   Housing Stability: Not on file        Allergies:  Allergies   Allergen Reactions    Ace Inhibitors Coughing and WHEEZING     Wheezing, sob, headaches, cough, insomnia    Amlodipine SWELLING    Diltiazem SWELLING    Naproxen WHEEZING     Right side of neck swelling         Medications:  Current Outpatient Medications   Medication Sig Dispense Refill    ALPRAZolam 0.25 MG Oral Tab Take 1 tablet (0.25 mg total) by mouth daily as needed.      ciprofloxacin 0.3 % Ophthalmic Solution Place 1 drop into the right eye 4 (four) times daily.      fluticasone furoate-vilanterol (BREO ELLIPTA) 200-25 MCG/ACT Inhalation Aerosol Powder, Breath Activated Inhale 1 puff into the lungs daily.      latanoprost 0.005 % Ophthalmic Solution INSTILL 1 DROP IN BOTH EYES EVERY EVENING. SEPARATE BY AT LEAST 10 MINUTES FROM OTHER INTRACULAR PRESSURE REDUCINGOPHTHALMIC DRUGS      Levalbuterol Tartrate 45 MCG/ACT Inhalation Aerosol Inhale 2 puffs into the lungs every 6 (six) hours.      prednisoLONE 1 % Ophthalmic Suspension INSTILL 1 DROP IN BOTH EYES FOUR TIMES DAILY FOR 4 DAYS AFTER SURGERY      escitalopram 5 MG Oral Tab Take 1 tablet (5 mg total) by mouth daily.      rOPINIRole 1 MG Oral Tab Take 1 tablet (1 mg total) by mouth every morning.      metoprolol succinate ER 25 MG Oral  Tablet 24 Hr Take 1 tablet (25 mg total) by mouth 2 (two) times daily.      hydrochlorothiazide 25 MG Oral Tab TK 1 T PO QD  1    HYDROcodone-acetaminophen 5-325 MG Oral Tab TK 1 T PO Q 6 H PRN  0    ROPINIRole HCl 3 MG Oral Tab TK 1 T PO QD  0    Rosuvastatin Calcium 20 MG Oral Tab TK 1 T PO QD HS  0    VALTREX 1 g Oral Tab TK 1 T PO Q 8 H  5    mupirocin 2 % External Ointment Apply to affected area bid x 30 days 1 Tube 0        Review of Systems:  A 10-point system was reviewed.  Pertinent positives and negatives are noted in HPI.      Physical Exam:  There were no vitals taken for this visit.        Neurological Exam:    AAOx3, following commands  PERRLA  EOMI  Face symmetrical  Tongue midline  Hearing symmetrical and intact to finger rub    No rhinorrhea or otorrhea    Romberg negative    Motor Strength:  Symmetrical in the lower extremities    Sensation to light touch:  Symmetrical in the lower extremities    Incision:  No incision    Abdomen:  Soft, non-distended, non-tender, with no rebound or guarding.  No peritoneal signs.     Extremities:  Non-tender, no lower extremity edema noted.      Labs:  CBC:  No results found for: \"WBC\", \"HGB\", \"HEMOGLOBIN\", \"HCT\", \"MCV\", \"PLT\"   BMG:  No results found for: \"GLUF\", \"CALCIUM\", \"NA\", \"K\", \"CO2\", \"CL\", \"BUN\", \"CREATININE\"   INR:  No results found for: \"INR\", \"PROTIME\"       Diagnostics:  I reviewed an x-ray of the lumbar spine with flexion and extension images.  The dynamic imaging does not demonstrate any evidence of overt instability in the setting of severe advanced degenerative disc disease affecting the entire lumbosacral spine.    A recent electrophysiologic evaluation was reviewed.  No evidence of peripheral neuropathy and no evidence of lumbosacral radiculopathy.    Diagnosis:  1. Paresthesia of both lower extremities      Assessment/Plan:  Our patient has mechanical lower back and lower extremity symptoms.  I do not believe that she is a surgical candidate in  my hands at this time.  The most appropriate recommendation would be to follow-up with the pain service to assess the option of neuromodulation.  A dorsal column stimulator trial can be performed in the office and if successful, we could implanted at the outpatient surgery center if she desires to do so.  She may follow-up with us depending on the outcome of the trial.    More than 30 minutes were spent during this visit and the coordination of this patient's care. All questions and concerns were addressed. We appreciate the opportunity to participate in the care of this patient. Please do not hesitate to call our office ([phone]) with any issues.        Jesus Boles M.D., F.A.A.N.S.    9/19/2024  1:49 PM    This note has been dictated utilizing voice recognition software. Unfortunately, this may lead to occasional typographical errors. If there are any questions regarding this, please do not hesitate to contact our office.

## 2024-10-08 ENCOUNTER — OFFICE VISIT (OUTPATIENT)
Dept: OTOLARYNGOLOGY | Facility: CLINIC | Age: 79
End: 2024-10-08

## 2024-10-08 VITALS — BODY MASS INDEX: 26.09 KG/M2 | HEIGHT: 61.5 IN | WEIGHT: 140 LBS

## 2024-10-08 DIAGNOSIS — E04.1 THYROID NODULE: Primary | ICD-10-CM

## 2024-10-08 DIAGNOSIS — E04.2 MULTIPLE THYROID NODULES: ICD-10-CM

## 2024-10-08 PROCEDURE — 99213 OFFICE O/P EST LOW 20 MIN: CPT | Performed by: SPECIALIST

## 2024-10-09 NOTE — PATIENT INSTRUCTIONS
Your thyroid nodule was stable from the last 2 ultrasounds.  The former 1 being done on November 2020.  Your outside biopsy showed that the 2.7 cm nodule had a benign fine-needle aspirate.  Repeat ultrasound on or around 9/13/2024.  Order was placed in the system.  Your last TSH was also within normal range.

## 2024-10-09 NOTE — PROGRESS NOTES
Sophia Almanzar is a 78 year old female. No chief complaint on file.    HPI:   Patient here to discuss her thyroid ultrasound results.  And to discuss her TSH results    Current Outpatient Medications   Medication Sig Dispense Refill    ciprofloxacin 0.3 % Ophthalmic Solution Place 1 drop into the right eye 4 (four) times daily.      fluticasone furoate-vilanterol (BREO ELLIPTA) 200-25 MCG/ACT Inhalation Aerosol Powder, Breath Activated Inhale 1 puff into the lungs daily.      latanoprost 0.005 % Ophthalmic Solution INSTILL 1 DROP IN BOTH EYES EVERY EVENING. SEPARATE BY AT LEAST 10 MINUTES FROM OTHER INTRACULAR PRESSURE REDUCINGOPHTHALMIC DRUGS      Levalbuterol Tartrate 45 MCG/ACT Inhalation Aerosol Inhale 2 puffs into the lungs every 6 (six) hours.      prednisoLONE 1 % Ophthalmic Suspension INSTILL 1 DROP IN BOTH EYES FOUR TIMES DAILY FOR 4 DAYS AFTER SURGERY      escitalopram 5 MG Oral Tab Take 1 tablet (5 mg total) by mouth daily.      rOPINIRole 1 MG Oral Tab Take 1 tablet (1 mg total) by mouth every morning.      metoprolol succinate ER 25 MG Oral Tablet 24 Hr Take 1 tablet (25 mg total) by mouth 2 (two) times daily.      hydrochlorothiazide 25 MG Oral Tab TK 1 T PO QD  1    HYDROcodone-acetaminophen 5-325 MG Oral Tab TK 1 T PO Q 6 H PRN  0    ROPINIRole HCl 3 MG Oral Tab TK 1 T PO QD  0    Rosuvastatin Calcium 20 MG Oral Tab TK 1 T PO QD HS  0    VALTREX 1 g Oral Tab TK 1 T PO Q 8 H  5    mupirocin 2 % External Ointment Apply to affected area bid x 30 days 1 Tube 0    ALPRAZolam 0.25 MG Oral Tab Take 1 tablet (0.25 mg total) by mouth daily as needed.        Past Medical History:    Allergic rhinitis    molds, dust, weed, feather    Essential hypertension    Thyroid disease      Social History:  Social History     Socioeconomic History    Marital status:    Tobacco Use    Smoking status: Former    Smokeless tobacco: Never   Vaping Use    Vaping status: Never Used   Substance and Sexual Activity     Alcohol use: Yes     Comment: rarely    Drug use: No        REVIEW OF SYSTEMS:   GENERAL HEALTH: feels well otherwise  GENERAL : denies fever, chills, sweats, weight loss, weight gain  SKIN: denies any unusual skin lesions or rashes  RESPIRATORY: denies shortness of breath with exertion  NEURO: denies headaches    EXAM:   Ht 5' 1.5\" (1.562 m)   Wt 140 lb (63.5 kg)   BMI 26.02 kg/m²   System Details   Skin Inspection - Normal.   Constitutional Overall appearance - Normal.   Head/Face Facial features - Normal. Eyebrows - Normal. Skull - Normal.   Eyes Conjunctiva - Right: Normal, Left: Normal. Pupil - Right: Normal, Left: Normal.    Ears Inspection - Right: Normal, Left: Normal.   Canal - Right: Normal, Left: Normal.   TM - Right: Normal, Left: Normal.   Nasal External nose - Normal.   Nasal septum - Normal.  Turbinates - Normal.   Oral/Oropharynx Lips - Normal, Tonsils - Normal, Tongue - Normal    Neck Exam Inspection - Normal. Palpation - Normal. Parotid gland - Normal. Thyroid gland -right thyroid nodule not palpable   Lymph Detail Submental. Submandibular. Anterior cervical. Posterior cervical. Supraclavicular all without enlargement   Psychiatric Orientation - Oriented to time, place, person & situation. Appropriate mood and affect.   Neurological Memory - Normal. Cranial nerves - Cranial nerves II through XII grossly intact.     ASSESSMENT AND PLAN:   1. Thyroid nodule  2.7 cm right thyroid nodule.  Stable since November 2020.  Fine-needle aspirate received and scanned into the system negative for the 2.7 cm nodule.  Repeat thyroid ultrasound in 1 years time, sooner if problems.  Order was placed in the system.  - US THYROID (CPT=76536); Future    2. Multiple thyroid nodules  TSH within normal range at 0.7.  Would also repeat this in 1 year's time, sooner if problems.  Patient is status post a left hemithyroidectomy.      The patient indicates understanding of these issues and agrees to the plan.      Sarah DE LUNA  MD [place]  10/8/2024  7:42 PM

## 2025-05-08 ENCOUNTER — HOSPITAL ENCOUNTER (INPATIENT)
Facility: HOSPITAL | Age: 80
LOS: 4 days | Discharge: HOME | End: 2025-05-12
Attending: EMERGENCY MEDICINE | Admitting: HOSPITALIST
Payer: MEDICARE

## 2025-05-08 ENCOUNTER — APPOINTMENT (OUTPATIENT)
Dept: INTERVENTIONAL RADIOLOGY/VASCULAR | Facility: HOSPITAL | Age: 80
End: 2025-05-08
Payer: MEDICARE

## 2025-05-08 ENCOUNTER — APPOINTMENT (OUTPATIENT)
Dept: GENERAL RADIOLOGY | Facility: HOSPITAL | Age: 80
End: 2025-05-08
Attending: EMERGENCY MEDICINE
Payer: MEDICARE

## 2025-05-08 ENCOUNTER — HOSPITAL ENCOUNTER (OUTPATIENT)
Age: 80
Discharge: ANOTHER HOSPITAL | End: 2025-05-08
Payer: MEDICARE

## 2025-05-08 VITALS
TEMPERATURE: 99 F | RESPIRATION RATE: 18 BRPM | SYSTOLIC BLOOD PRESSURE: 143 MMHG | DIASTOLIC BLOOD PRESSURE: 73 MMHG | HEART RATE: 87 BPM | OXYGEN SATURATION: 93 %

## 2025-05-08 DIAGNOSIS — R94.31 ABNORMAL EKG: ICD-10-CM

## 2025-05-08 DIAGNOSIS — R03.0 ELEVATED BLOOD PRESSURE READING: ICD-10-CM

## 2025-05-08 DIAGNOSIS — R06.00 DYSPNEA, UNSPECIFIED TYPE: ICD-10-CM

## 2025-05-08 DIAGNOSIS — I21.4 NSTEMI (NON-ST ELEVATED MYOCARDIAL INFARCTION) (HCC): ICD-10-CM

## 2025-05-08 DIAGNOSIS — J45.909 ACUTE ASTHMA (HCC): ICD-10-CM

## 2025-05-08 DIAGNOSIS — R07.9 CHEST PAIN, UNSPECIFIED TYPE: Primary | ICD-10-CM

## 2025-05-08 DIAGNOSIS — R07.9 ACUTE CHEST PAIN: Primary | ICD-10-CM

## 2025-05-08 LAB
ALBUMIN SERPL-MCNC: 4.9 G/DL (ref 3.2–4.8)
ALBUMIN/GLOB SERPL: 2.1 {RATIO} (ref 1–2)
ALP LIVER SERPL-CCNC: 87 U/L (ref 55–142)
ALT SERPL-CCNC: 92 U/L (ref 10–49)
ANION GAP SERPL CALC-SCNC: 10 MMOL/L (ref 0–18)
ANION GAP SERPL CALC-SCNC: 10 MMOL/L (ref 0–18)
ANTIBODY SCREEN: NEGATIVE
APTT PPP: 25.7 SECONDS (ref 23–36)
APTT PPP: 58.4 SECONDS (ref 23–36)
AST SERPL-CCNC: 102 U/L (ref ?–34)
ATRIAL RATE: 82 BPM
ATRIAL RATE: 83 BPM
BASOPHILS # BLD AUTO: 0.03 X10(3) UL (ref 0–0.2)
BASOPHILS # BLD AUTO: 0.05 X10(3) UL (ref 0–0.2)
BASOPHILS NFR BLD AUTO: 0.3 %
BASOPHILS NFR BLD AUTO: 0.5 %
BILIRUB SERPL-MCNC: 0.8 MG/DL (ref 0.2–1.1)
BUN BLD-MCNC: 12 MG/DL (ref 9–23)
BUN BLD-MCNC: 12 MG/DL (ref 9–23)
BUN/CREAT SERPL: 19.7 (ref 10–20)
BUN/CREAT SERPL: 19.7 (ref 10–20)
CALCIUM BLD-MCNC: 9.3 MG/DL (ref 8.7–10.4)
CALCIUM BLD-MCNC: 9.3 MG/DL (ref 8.7–10.4)
CHLORIDE SERPL-SCNC: 104 MMOL/L (ref 98–112)
CHLORIDE SERPL-SCNC: 104 MMOL/L (ref 98–112)
CHOLEST SERPL-MCNC: 139 MG/DL (ref ?–200)
CO2 SERPL-SCNC: 30 MMOL/L (ref 21–32)
CO2 SERPL-SCNC: 30 MMOL/L (ref 21–32)
CREAT BLD-MCNC: 0.61 MG/DL (ref 0.55–1.02)
CREAT BLD-MCNC: 0.61 MG/DL (ref 0.55–1.02)
D DIMER PPP FEU-MCNC: 0.62 UG/ML FEU (ref ?–0.78)
DEPRECATED RDW RBC AUTO: 46.5 FL (ref 35.1–46.3)
DEPRECATED RDW RBC AUTO: 47.5 FL (ref 35.1–46.3)
EGFRCR SERPLBLD CKD-EPI 2021: 91 ML/MIN/1.73M2 (ref 60–?)
EGFRCR SERPLBLD CKD-EPI 2021: 91 ML/MIN/1.73M2 (ref 60–?)
EOSINOPHIL # BLD AUTO: 0 X10(3) UL (ref 0–0.7)
EOSINOPHIL # BLD AUTO: 0.06 X10(3) UL (ref 0–0.7)
EOSINOPHIL NFR BLD AUTO: 0 %
EOSINOPHIL NFR BLD AUTO: 0.6 %
ERYTHROCYTE [DISTWIDTH] IN BLOOD BY AUTOMATED COUNT: 13.4 % (ref 11–15)
ERYTHROCYTE [DISTWIDTH] IN BLOOD BY AUTOMATED COUNT: 13.5 % (ref 11–15)
GLOBULIN PLAS-MCNC: 2.3 G/DL (ref 2–3.5)
GLUCOSE BLD-MCNC: 113 MG/DL (ref 70–99)
GLUCOSE BLD-MCNC: 113 MG/DL (ref 70–99)
GLUCOSE BLDC GLUCOMTR-MCNC: 165 MG/DL (ref 70–99)
GLUCOSE BLDC GLUCOMTR-MCNC: 182 MG/DL (ref 70–99)
HCT VFR BLD AUTO: 39.1 % (ref 35–48)
HCT VFR BLD AUTO: 42.4 % (ref 35–48)
HDLC SERPL-MCNC: 58 MG/DL (ref 40–59)
HGB BLD-MCNC: 12.6 G/DL (ref 12–16)
HGB BLD-MCNC: 13.6 G/DL (ref 12–16)
IMM GRANULOCYTES # BLD AUTO: 0.04 X10(3) UL (ref 0–1)
IMM GRANULOCYTES # BLD AUTO: 0.04 X10(3) UL (ref 0–1)
IMM GRANULOCYTES NFR BLD: 0.4 %
IMM GRANULOCYTES NFR BLD: 0.4 %
ISTAT ACTIVATED CLOTTING TIME: 187 SECONDS (ref 125–137)
LDLC SERPL CALC-MCNC: 66 MG/DL (ref ?–100)
LYMPHOCYTES # BLD AUTO: 0.75 X10(3) UL (ref 1–4)
LYMPHOCYTES # BLD AUTO: 1.61 X10(3) UL (ref 1–4)
LYMPHOCYTES NFR BLD AUTO: 15.7 %
LYMPHOCYTES NFR BLD AUTO: 7.6 %
MAGNESIUM SERPL-MCNC: 2.5 MG/DL (ref 1.6–2.6)
MCH RBC QN AUTO: 30.1 PG (ref 26–34)
MCH RBC QN AUTO: 30.6 PG (ref 26–34)
MCHC RBC AUTO-ENTMCNC: 32.1 G/DL (ref 31–37)
MCHC RBC AUTO-ENTMCNC: 32.2 G/DL (ref 31–37)
MCV RBC AUTO: 93.5 FL (ref 80–100)
MCV RBC AUTO: 95.3 FL (ref 80–100)
MONOCYTES # BLD AUTO: 0.16 X10(3) UL (ref 0.1–1)
MONOCYTES # BLD AUTO: 1.09 X10(3) UL (ref 0.1–1)
MONOCYTES NFR BLD AUTO: 1.6 %
MONOCYTES NFR BLD AUTO: 10.6 %
NEUTROPHILS # BLD AUTO: 7.42 X10 (3) UL (ref 1.5–7.7)
NEUTROPHILS # BLD AUTO: 7.42 X10(3) UL (ref 1.5–7.7)
NEUTROPHILS # BLD AUTO: 8.89 X10 (3) UL (ref 1.5–7.7)
NEUTROPHILS # BLD AUTO: 8.89 X10(3) UL (ref 1.5–7.7)
NEUTROPHILS NFR BLD AUTO: 72.2 %
NEUTROPHILS NFR BLD AUTO: 90.1 %
NONHDLC SERPL-MCNC: 81 MG/DL (ref ?–130)
OSMOLALITY SERPL CALC.SUM OF ELEC: 299 MOSM/KG (ref 275–295)
OSMOLALITY SERPL CALC.SUM OF ELEC: 299 MOSM/KG (ref 275–295)
P AXIS: 70 DEGREES
P AXIS: 77 DEGREES
P-R INTERVAL: 138 MS
P-R INTERVAL: 144 MS
PLATELET # BLD AUTO: 206 10(3)UL (ref 150–450)
PLATELET # BLD AUTO: 214 10(3)UL (ref 150–450)
POTASSIUM SERPL-SCNC: 3.5 MMOL/L (ref 3.5–5.1)
POTASSIUM SERPL-SCNC: 3.5 MMOL/L (ref 3.5–5.1)
PROT SERPL-MCNC: 7.2 G/DL (ref 5.7–8.2)
Q-T INTERVAL: 380 MS
Q-T INTERVAL: 408 MS
QRS DURATION: 92 MS
QRS DURATION: 94 MS
QTC CALCULATION (BEZET): 446 MS
QTC CALCULATION (BEZET): 476 MS
R AXIS: 47 DEGREES
R AXIS: 56 DEGREES
RBC # BLD AUTO: 4.18 X10(6)UL (ref 3.8–5.3)
RBC # BLD AUTO: 4.45 X10(6)UL (ref 3.8–5.3)
RH BLOOD TYPE: NEGATIVE
RH BLOOD TYPE: NEGATIVE
SODIUM SERPL-SCNC: 144 MMOL/L (ref 136–145)
SODIUM SERPL-SCNC: 144 MMOL/L (ref 136–145)
T AXIS: 67 DEGREES
T AXIS: 88 DEGREES
TRIGL SERPL-MCNC: 75 MG/DL (ref 30–149)
TROPONIN I SERPL HS-MCNC: 7827 NG/L (ref ?–34)
TROPONIN I SERPL HS-MCNC: 8078 NG/L (ref ?–34)
TROPONIN I SERPL HS-MCNC: 8888 NG/L (ref ?–34)
VENTRICULAR RATE: 82 BPM
VENTRICULAR RATE: 83 BPM
VLDLC SERPL CALC-MCNC: 11 MG/DL (ref 0–30)
WBC # BLD AUTO: 10.3 X10(3) UL (ref 4–11)
WBC # BLD AUTO: 9.9 X10(3) UL (ref 4–11)

## 2025-05-08 PROCEDURE — 93000 ELECTROCARDIOGRAM COMPLETE: CPT | Performed by: PHYSICIAN ASSISTANT

## 2025-05-08 PROCEDURE — 4A023N7 MEASUREMENT OF CARDIAC SAMPLING AND PRESSURE, LEFT HEART, PERCUTANEOUS APPROACH: ICD-10-PCS | Performed by: INTERNAL MEDICINE

## 2025-05-08 PROCEDURE — 71045 X-RAY EXAM CHEST 1 VIEW: CPT | Performed by: EMERGENCY MEDICINE

## 2025-05-08 PROCEDURE — B2111ZZ FLUOROSCOPY OF MULTIPLE CORONARY ARTERIES USING LOW OSMOLAR CONTRAST: ICD-10-PCS | Performed by: INTERNAL MEDICINE

## 2025-05-08 PROCEDURE — B2151ZZ FLUOROSCOPY OF LEFT HEART USING LOW OSMOLAR CONTRAST: ICD-10-PCS | Performed by: INTERNAL MEDICINE

## 2025-05-08 PROCEDURE — 99223 1ST HOSP IP/OBS HIGH 75: CPT | Performed by: HOSPITALIST

## 2025-05-08 PROCEDURE — 99215 OFFICE O/P EST HI 40 MIN: CPT | Performed by: PHYSICIAN ASSISTANT

## 2025-05-08 PROCEDURE — B3101ZZ FLUOROSCOPY OF THORACIC AORTA USING LOW OSMOLAR CONTRAST: ICD-10-PCS | Performed by: INTERNAL MEDICINE

## 2025-05-08 RX ORDER — HEPARIN SODIUM AND DEXTROSE 10000; 5 [USP'U]/100ML; G/100ML
12 INJECTION INTRAVENOUS ONCE
Status: DISCONTINUED | OUTPATIENT
Start: 2025-05-08 | End: 2025-05-08

## 2025-05-08 RX ORDER — ACETAMINOPHEN 500 MG
500 TABLET ORAL EVERY 4 HOURS PRN
Status: DISCONTINUED | OUTPATIENT
Start: 2025-05-08 | End: 2025-05-12

## 2025-05-08 RX ORDER — ESCITALOPRAM OXALATE 5 MG/1
5 TABLET ORAL DAILY
Status: DISCONTINUED | OUTPATIENT
Start: 2025-05-08 | End: 2025-05-12

## 2025-05-08 RX ORDER — ATORVASTATIN CALCIUM 40 MG/1
40 TABLET, FILM COATED ORAL NIGHTLY
Status: DISCONTINUED | OUTPATIENT
Start: 2025-05-08 | End: 2025-05-12

## 2025-05-08 RX ORDER — HYDROCHLOROTHIAZIDE 25 MG/1
25 TABLET ORAL ONCE
Status: COMPLETED | OUTPATIENT
Start: 2025-05-08 | End: 2025-05-08

## 2025-05-08 RX ORDER — SODIUM CHLORIDE 9 MG/ML
INJECTION, SOLUTION INTRAVENOUS
Status: ACTIVE | OUTPATIENT
Start: 2025-05-09 | End: 2025-05-09

## 2025-05-08 RX ORDER — HYDRALAZINE HYDROCHLORIDE 20 MG/ML
INJECTION INTRAMUSCULAR; INTRAVENOUS
Status: COMPLETED
Start: 2025-05-08 | End: 2025-05-08

## 2025-05-08 RX ORDER — HEPARIN SODIUM AND DEXTROSE 10000; 5 [USP'U]/100ML; G/100ML
INJECTION INTRAVENOUS CONTINUOUS
Status: DISCONTINUED | OUTPATIENT
Start: 2025-05-08 | End: 2025-05-10

## 2025-05-08 RX ORDER — HEPARIN SODIUM 1000 [USP'U]/ML
60 INJECTION, SOLUTION INTRAVENOUS; SUBCUTANEOUS ONCE
Status: COMPLETED | OUTPATIENT
Start: 2025-05-08 | End: 2025-05-08

## 2025-05-08 RX ORDER — IOPAMIDOL 612 MG/ML
122 INJECTION, SOLUTION INTRAVASCULAR
Status: COMPLETED | OUTPATIENT
Start: 2025-05-08 | End: 2025-05-08

## 2025-05-08 RX ORDER — LIDOCAINE HYDROCHLORIDE 20 MG/ML
INJECTION, SOLUTION EPIDURAL; INFILTRATION; INTRACAUDAL; PERINEURAL
Status: COMPLETED
Start: 2025-05-08 | End: 2025-05-08

## 2025-05-08 RX ORDER — CHLORHEXIDINE GLUCONATE 40 MG/ML
SOLUTION TOPICAL
Status: ACTIVE | OUTPATIENT
Start: 2025-05-09 | End: 2025-05-09

## 2025-05-08 RX ORDER — ALBUTEROL SULFATE 5 MG/ML
10 SOLUTION RESPIRATORY (INHALATION) ONCE
Status: COMPLETED | OUTPATIENT
Start: 2025-05-08 | End: 2025-05-08

## 2025-05-08 RX ORDER — ONDANSETRON 2 MG/ML
4 INJECTION INTRAMUSCULAR; INTRAVENOUS EVERY 6 HOURS PRN
Status: DISCONTINUED | OUTPATIENT
Start: 2025-05-08 | End: 2025-05-12

## 2025-05-08 RX ORDER — TEMAZEPAM 15 MG/1
15 CAPSULE ORAL NIGHTLY PRN
Status: DISCONTINUED | OUTPATIENT
Start: 2025-05-08 | End: 2025-05-12

## 2025-05-08 RX ORDER — NITROGLYCERIN 0.4 MG/1
0.4 TABLET SUBLINGUAL ONCE
Status: COMPLETED | OUTPATIENT
Start: 2025-05-08 | End: 2025-05-08

## 2025-05-08 RX ORDER — HYDROCODONE BITARTRATE AND ACETAMINOPHEN 5; 325 MG/1; MG/1
1 TABLET ORAL EVERY 6 HOURS PRN
Status: DISCONTINUED | OUTPATIENT
Start: 2025-05-08 | End: 2025-05-12

## 2025-05-08 RX ORDER — ALPRAZOLAM 0.25 MG
0.25 TABLET ORAL NIGHTLY
Status: DISCONTINUED | OUTPATIENT
Start: 2025-05-08 | End: 2025-05-12

## 2025-05-08 RX ORDER — LORAZEPAM 2 MG/ML
0.5 INJECTION INTRAMUSCULAR ONCE
Status: COMPLETED | OUTPATIENT
Start: 2025-05-08 | End: 2025-05-08

## 2025-05-08 RX ORDER — METHYLPREDNISOLONE SODIUM SUCCINATE 40 MG/ML
40 INJECTION INTRAMUSCULAR; INTRAVENOUS ONCE
Status: COMPLETED | OUTPATIENT
Start: 2025-05-08 | End: 2025-05-08

## 2025-05-08 RX ORDER — ASPIRIN 81 MG/1
324 TABLET, CHEWABLE ORAL ONCE
Status: COMPLETED | OUTPATIENT
Start: 2025-05-08 | End: 2025-05-08

## 2025-05-08 RX ORDER — ROPINIROLE 0.5 MG/1
2 TABLET, FILM COATED ORAL NIGHTLY
Status: DISCONTINUED | OUTPATIENT
Start: 2025-05-08 | End: 2025-05-12

## 2025-05-08 RX ORDER — MAGNESIUM SULFATE HEPTAHYDRATE 40 MG/ML
2 INJECTION, SOLUTION INTRAVENOUS ONCE
Status: COMPLETED | OUTPATIENT
Start: 2025-05-08 | End: 2025-05-08

## 2025-05-08 RX ORDER — ASPIRIN 81 MG/1
324 TABLET, CHEWABLE ORAL ONCE
Status: DISCONTINUED | OUTPATIENT
Start: 2025-05-08 | End: 2025-05-09 | Stop reason: ALTCHOICE

## 2025-05-08 RX ORDER — METOCLOPRAMIDE HYDROCHLORIDE 5 MG/ML
10 INJECTION INTRAMUSCULAR; INTRAVENOUS EVERY 8 HOURS PRN
Status: DISCONTINUED | OUTPATIENT
Start: 2025-05-08 | End: 2025-05-12

## 2025-05-08 RX ORDER — HEPARIN SODIUM AND DEXTROSE 10000; 5 [USP'U]/100ML; G/100ML
12 INJECTION INTRAVENOUS ONCE
Status: COMPLETED | OUTPATIENT
Start: 2025-05-08 | End: 2025-05-08

## 2025-05-08 RX ORDER — NITROGLYCERIN 20 MG/100ML
INJECTION INTRAVENOUS CONTINUOUS
Status: DISCONTINUED | OUTPATIENT
Start: 2025-05-08 | End: 2025-05-08

## 2025-05-08 RX ORDER — LATANOPROST 50 UG/ML
1 SOLUTION/ DROPS OPHTHALMIC NIGHTLY
Status: DISCONTINUED | OUTPATIENT
Start: 2025-05-08 | End: 2025-05-12

## 2025-05-08 RX ORDER — MIDAZOLAM HYDROCHLORIDE 1 MG/ML
INJECTION INTRAMUSCULAR; INTRAVENOUS
Status: COMPLETED
Start: 2025-05-08 | End: 2025-05-08

## 2025-05-08 RX ORDER — HEPARIN SODIUM 1000 [USP'U]/ML
INJECTION, SOLUTION INTRAVENOUS; SUBCUTANEOUS
Status: COMPLETED
Start: 2025-05-08 | End: 2025-05-08

## 2025-05-08 RX ORDER — HEPARIN SODIUM 1000 [USP'U]/ML
60 INJECTION, SOLUTION INTRAVENOUS; SUBCUTANEOUS ONCE
Status: DISCONTINUED | OUTPATIENT
Start: 2025-05-08 | End: 2025-05-08

## 2025-05-08 RX ORDER — METOPROLOL TARTRATE 25 MG/1
25 TABLET, FILM COATED ORAL
Status: DISCONTINUED | OUTPATIENT
Start: 2025-05-08 | End: 2025-05-12

## 2025-05-08 RX ORDER — METOPROLOL TARTRATE 25 MG/1
25 TABLET, FILM COATED ORAL ONCE
Status: COMPLETED | OUTPATIENT
Start: 2025-05-08 | End: 2025-05-08

## 2025-05-08 NOTE — H&P
[facility]    PATIENT'S NAME: BRENDA CADENA   ATTENDING PHYSICIAN: Loc Daigle MD   PATIENT ACCOUNT#:   [id number]    LOCATION:  [address]  MEDICAL RECORD #:   [id number]       YOB: 1945  ADMISSION DATE:       05/08/2025    HISTORY AND PHYSICAL EXAMINATION    CHIEF COMPLAINT:  Non-ST-elevation myocardial infarction.    HISTORY OF PRESENT ILLNESS:  Patient is a 79-year-old  female, who came into the emergency department for evaluation initially for wheezing since last night.  Then, she told the ER physician she was having also chest pain.  CBC was unremarkable.  Troponin was 8078.  EKG showed inferior T-wave ST depression and lateral ST depression.  Basic metabolic panel, liver function tests, and second troponin are still pending.  D-dimer was negative.  Chest x-ray showed no acute findings.  Started on IV nitroglycerin, aspirin, and IV heparin, and she will be taken to the catheterization lab for further investigation.    PAST MEDICAL HISTORY:  Asthma, reactive airway disease, hypertension, anxiety, kidney stones, restless legs syndrome, mild aortic stenosis, generalized osteoarthritis, degenerative joint disease of lumbar spine.    PAST SURGICAL HISTORY:  Left hemithyroidectomy, appendectomy, tonsillectomy, and cholecystectomy.     MEDICATIONS:  Please see medication reconciliation list.     ALLERGIES:  ACE inhibitors and Naprosyn.  She said she took aspirin in the past without problems.    FAMILY HISTORY:  Mother had heart failure.  Father had coronary artery disease.      SOCIAL HISTORY:  Ex-tobacco user.  Social alcohol.  No drug use.      REVIEW OF SYSTEMS:  Patient said since last night she was wheezing on and off and she was having left-sided chest discomfort and then epigastric discomfort.  She felt it also in bilateral shoulders on and off since last night.  Now, she is complaining of midsternal chest pressure plus wheezing.  Other 12-point review of systems is  negative.       PHYSICAL EXAMINATION:    GENERAL:  Alert and oriented to time, place and person.  Very anxious.  No acute distress.   VITAL SIGNS:  Temperature 98.2, pulse 87, respiratory rate 20, blood pressure 172/88, pulse ox 87% to 100%.  HEENT:  Atraumatic.  Oropharynx clear.  Moist mucous membranes.  Ears and nose normal.  Eyes:  Anicteric sclerae.   NECK:  Supple.  No lymphadenopathy.  Trachea midline.    LUNGS:  Clear to auscultation bilaterally.  Normal respiratory effort.   HEART:  Regular rate and rhythm.  S1 and S2 auscultated.  No murmur.    ABDOMEN:  Soft, nondistended.  No tenderness.  Positive bowel sounds.   EXTREMITIES:  No peripheral edema, clubbing or cyanosis.   NEUROLOGIC:  Motor and sensory intact.    ASSESSMENT:    1.   Non-ST-elevation myocardial infarction.  2.   Very mild asthma exacerbation.  3.   Essential hypertension.   4.   Severe anxiety disorder.    PLAN:  Patient will be admitted to telemetry floor.  N.p.o.  IV heparin.  Nitroglycerin.  Oral aspirin.  Pulmonary, critical care, and cardiology consults.  Cardiac angiogram later on this afternoon.  Further recommendations to follow.     Dictated By Elza Yuen MD  d: 05/08/2025 16:32:25  t: 05/08/2025 16:43:58  Job [id number]  FB/

## 2025-05-08 NOTE — ED PROVIDER NOTES
Patient Seen in: [facility]    History     Chief Complaint   Patient presents with    Headache    Asthma    Chest Pain     Stated Complaint: High BP; Headache; Glucoma; Asthm Attack; Chest Pain    HPI    79-year-old female presents with chief complaint of bilateral anterior chest pain.  Onset last night.  Patient reports associated dyspnea.  Patient states that pain radiates to left upper extremity.  Patient believed she was having an acute asthma exacerbation, but states she did not get any relief after taking albuterol.  Patient also reports headache.  Patient denies fever, chills, injury or trauma, cough, hemoptysis, abdominal pain, nausea, vomiting, diaphoresis, ecchymosis, erythema, rash, weakness, paraesthesias, extremity swelling, vision changes, altered mental status, loss of consciousness, amnesia.    Past Medical History[1]    Past Surgical History[2]         Family History[3]    Short Social Hx on File[4]    Review of Systems    Positive for stated complaint: High BP; Headache; Glucoma; Asthm Attack; Chest Pain  Other systems are as noted in HPI.  Constitutional and vital signs reviewed.      All other systems reviewed and negative except as noted above.    PSFH elements reviewed from today and agreed except as otherwise stated in HPI.    Physical Exam     ED Triage Vitals [05/08/25 1230]   /73   Pulse 87   Resp 18   Temp 98.5 °F (36.9 °C)   Temp src Oral   SpO2 93 %   O2 Device None (Room air)       Current:/73   Pulse 87   Temp 98.5 °F (36.9 °C) (Oral)   Resp 18   LMP  (LMP Unknown)   SpO2 93%      PULSE OX decreased at 93% on room air as interpreted by this provider.    Constitutional: The patient is cooperative. Appears well-developed and well-nourished.  Mild discomfort.  Psychological: Alert, No abnormalities of mood, affect.  Head: Normocephalic/atraumatic.  Eyes: Pupils are equal round reactive to light.  Conjunctiva are within normal limits.  ENT: Oropharynx is  clear.  Neck: The neck is supple.  No meningeal signs.  Chest: There is no tenderness to the chest wall.  No CVA tenderness bilaterally.  Respiratory: Respiratory effort was normal.  There is no rales, wheezes, or rhonchi.  There is no stridor.  Air entry is equal.  Cardiovascular: Regular rate and rhythm.  Positive systolic murmur.  Capillary refill is brisk.    Genitourinary: Not examined.  Lymphatic: No gross lymphadenopathy noted.  Musculoskeletal: Musculoskeletal system is grossly intact.  There is no obvious deformity.  Neurological: No facial asymmetry.  Normal gait.  Normal sensory exam.  Patient exhibits normal speech.  Strength and range of motion symmetrical of all extremities x4.  Skin: Skin is normal to inspection.  Warm and dry.  No obvious bruising.  No obvious rash.    ED Course   Labs Reviewed - No data to display  EKG    Rate, intervals and axes as noted on EKG Report.  Rate: 82  Rhythm: Sinus Rhythm  Reading: No STEMI, possible left atrial enlargement, ST and T wave abnormality, consider lateral ischemia, prolonged QT    No previous EKGs available in M[facility]           MDM       Heart Score:    HEART Score      Title      Criteria Score   Age: 65 and older Age Score: 2   History: Mod Suspicious Hx Score: 1     EKG: Normal EKG Score: 0   HTN: Yes   Hypercholesterolemia: No   Atherosclerosis/PVD: No     DM: No   BMI>30kg/m2: No   Smoking: No   Family History: No         Other Risk Factor Score: 1                     HEART Score: 4        Risk of adverse cardiac event is 12-16.6%          Physical exam remained stable as previously documented.  Results reviewed with patient.    79-year-old female presents with chief complaint of bilateral anterior chest pain and shortness of breath that began last night.  Patient also reports headache.  Physical exam as documented above.  Discussed with patient need for further evaluation and possible workup in emergency department.  Patient is agreeable.  Patient  declined EMS transfer.      The patient was informed of their elevated blood pressure reading at immediate care.  They were informed of the dangers of undiagnosed and untreated hypertension.  Education regarding lifestyle modifications and the need for appropriate follow-up with their PCP to have their blood pressure re-checked within 24-48 hours was provided.    Patient case discussed with Dr. Duque.    Disposition and Plan     Clinical Impression:  1. Chest pain, unspecified type    2. Dyspnea, unspecified type    3. Abnormal EKG    4. Elevated blood pressure reading        Disposition:  Ic to ed    Follow-up:  No follow-up provider specified.    Medications Prescribed:  Discharge Medication List as of 5/8/2025 12:54 PM                         [1]   Past Medical History:   Allergic rhinitis    molds, dust, weed, feather    Essential hypertension    Thyroid disease   [2]   Past Surgical History:  Procedure Laterality Date    Appendectomy  09/2009    Cholecystectomy      Tonsillectomy Left 03/1993   [3]   Family History  Problem Relation Age of Onset    Ear Problems Other     Cancer Other     Thyroid disease Other    [4]   Social History  Socioeconomic History    Marital status:    Tobacco Use    Smoking status: Former    Smokeless tobacco: Never   Vaping Use    Vaping status: Never Used   Substance and Sexual Activity    Alcohol use: Yes     Comment: rarely    Drug use: No

## 2025-05-08 NOTE — CONSULTS
[facility]  part of [facility]    Cardiology Consultation    Sophia Almanzar Patient Status:  Emergency    10/25/1945 MRN [id number]   Location [facility] EMERGENCY DEPARTMENT Attending Loc Daigle MD   Hosp Day # 0 PCP SOPHIA DICKERSON MD     Date of Admission:  2025  Date of Consult:  2025  Reason for Consultation: NSTEMI    History of Present Illness:   Patient is a 79 year old female with sig PMH/o asthma, hypertension who presents with acute onset chest pain and wheezing last night.  Patient states that last night at around 10 PM she experienced acute onset chest pain, substernally, felt like a baseball bat hitting her chest that was associated with severe wheezing.  Patient attributed to asthma exacerbation and attempted albuterol without relief.  Symptoms persisted although slightly abated by early morning.   In the ED, vitals notable for elevated BP.  CXR without acute cardiopulmonary process.  Initial EKG with sinus rhythm and lateral ST depressions.  Initial high-sensitivity troponin elevated at 8078.  Currently reports dull chest pressure, improved compared to last night.  No dyspnea  Assessment and Plan:   Asthma  Hypertension    NSTEMI  -Presents with acute onset chest pain overnight, associated with wheezing, has improved by the time of presentation  -Initial high-sensitivity troponin is significantly elevated at 8,078  -Initial ECG with lateral ST depressions  -Clinically appears compensated from cardiac standpoint, no evidence of HF  -Etiology is concerning for late presentation NSTEMI  -Start anticoagulation, antiplatelet, lipid-lowering and beta-blockade therapies  -Continue trending high-sensitivity troponin/ECGs  -Continuous telemetric monitoring  -Obtain TTE  -CAD risk factor evaluation: FLP, HgbA1c, LP(a)  -Plan for urgent ischemic evaluation with [facility] today    Past Medical History  Past Medical History[1]    Past Surgical History  Past Surgical  History[2]    Family History  Family History[3]    Social History  Pediatric History   Patient Parents    Not on file     Other Topics Concern    Caffeine Concern Not Asked    Exercise Not Asked    Seat Belt Not Asked    Special Diet Not Asked    Stress Concern Not Asked    Weight Concern Not Asked   Social History Narrative    Not on file           Current Medications:  Current Hospital Medications[4]  Prescriptions Prior to Admission[5]    Allergies  Allergies[6]    Review of Systems:   ROS  10 point review of systems completed and negative except as noted.    Physical Exam:   Patient Vitals for the past 24 hrs:   BP Temp Temp src Pulse Resp SpO2 Height Weight   05/08/25 1615 (!) 172/88 -- -- 87 20 (!) 87 % -- --   05/08/25 1609 -- -- -- -- -- -- -- 139 lb 15.9 oz (63.5 kg)   05/08/25 1515 (!) 202/77 -- -- 85 26 100 % -- --   05/08/25 1500 (!) 214/86 -- -- 82 26 99 % -- --   05/08/25 1430 (!) 192/92 -- -- 82 21 91 % -- --   05/08/25 1335 (!) 154/95 98.2 °F (36.8 °C) Temporal 88 22 95 % 5' 1\" (1.549 m) 147 lb (66.7 kg)       Intake/Output:   Last 3 shifts:   Vent Settings:    Hemodynamic parameters (last 24 hours):    Scheduled Meds: Scheduled Medications[7]  Continuous Infusions: Medication Infusions[8]    Physical Exam:  General: Alert and oriented x 3. No apparent distress.   HEENT: Normocephalic, anicteric sclera, neck supple, no thyromegaly or adenopathy.  Neck: No JVD, carotids 2+, no bruits.  Cardiac: Regular rate and rhythm. S1, S2 normal. No murmur, pericardial rub, S3, or extra cardiac sounds.  Lungs: Clear without wheezes, rales, rhonchi or dullness.  Normal excursions and effort.  Abdomen: Soft, non-tender. No organosplenomegally, mass or rebound, BS-present.  Extremities: Without clubbing or cyanosis. No edema.    Neurologic: Alert and oriented, normal affect. No focal defects  Skin: Warm and dry.     Results:   Laboratory Data:  Lab Results   Component Value Date    WBC 10.3 05/08/2025    HGB 13.6  05/08/2025    HCT 42.4 05/08/2025    .0 05/08/2025    PTT 25.7 05/08/2025    DDIMER 0.62 05/08/2025         No results for input(s): \"GLU\", \"BUN\", \"CREATSERUM\", \"GFRAA\", \"GFRNAA\", \"CA\", \"NA\", \"K\", \"CL\", \"CO2\" in the last 168 hours.  Recent Labs   Lab 05/08/25  1423   RBC 4.45   HGB 13.6   HCT 42.4   MCV 95.3   MCH 30.6   MCHC 32.1   RDW 13.4   NEPRELIM 7.42   WBC 10.3   .0       No results for input(s): \"BNPML\" in the last 168 hours.    No results for input(s): \"TROP\", \"CK\" in the last 168 hours.    Imaging:  No results found.    Thank you for allowing me to participate in the care of your patient.    Albert Palumbo, [facility] [place]       [1]   Past Medical History:   Allergic rhinitis    molds, dust, weed, feather    Essential hypertension    Thyroid disease   [2]   Past Surgical History:  Procedure Laterality Date    Appendectomy  09/2009    Cholecystectomy      Tonsillectomy Left 03/1993   [3]   Family History  Problem Relation Age of Onset    Ear Problems Other     Cancer Other     Thyroid disease Other    [4]   Current Facility-Administered Medications   Medication Dose Route Frequency    aspirin chewable tab 324 mg  324 mg Oral Once    heparin (Porcine) 99387 units/250mL infusion ACS/AFIB CONTINUOUS  200-3,000 Units/hr Intravenous Continuous    heparin (Porcine) 1000 UNIT/ML injection - BOLUS IV 3,800 Units  60 Units/kg Intravenous Once    heparin (Porcine) 42198 units/250mL infusion ED INITIAL DOSE  12 Units/kg/hr Intravenous Once    nitroglycerin (Nitrostat) SL tab 0.4 mg  0.4 mg Sublingual Once    nitroGLYCERIN (Nitrobid) 2 % ointment 1 inch  1 inch Topical Once    nitroGLYCERIN in dextrose 5% 50 mg/250mL infusion premix  5-400 mcg/min Intravenous Continuous    [START ON 5/9/2025] chlorhexidine (Hibiclens) 4 % external liquid   Topical On Call    [START ON 5/9/2025] sodium chloride 0.9% infusion   Intravenous On Call    aspirin chewable tab 324 mg  324 mg Oral Once   [5]  (Not in a hospital admission)  [6]   Allergies  Allergen Reactions    Ace Inhibitors Coughing and WHEEZING     Wheezing, sob, headaches, cough, insomnia    Amlodipine SWELLING    Diltiazem SWELLING    Naproxen WHEEZING     Right side of neck swelling    Lisinopril Coughing     lisinopril   [7]    aspirin  324 mg Oral Once    heparin  60 Units/kg Intravenous Once    ED initial dose heparin  12 Units/kg/hr Intravenous Once    nitroglycerin  0.4 mg Sublingual Once    nitroGLYCERIN  1 inch Topical Once    [START ON 5/9/2025] chlorhexidine   Topical On Call    [START ON 5/9/2025] sodium chloride   Intravenous On Call    aspirin  324 mg Oral Once   [8]    continuous dose heparin      nitroGLYCERIN in dextrose 5%

## 2025-05-08 NOTE — ED INITIAL ASSESSMENT (HPI)
Pt c/o wheezing and epigastric pain that radiates to left breast that started at 2230 last night.Pain with inspiration. Albuterol taken last night with no relief. States \"medically induced asthma\" Sent by urgent care for further eval.

## 2025-05-08 NOTE — ED INITIAL ASSESSMENT (HPI)
Per pt. Had asthma flare last night with chest pain and SOB started last night. Chest  pain radiating to left upper arm. Non injury no trauma

## 2025-05-08 NOTE — ED PROVIDER NOTES
Patient Seen in: [facility] Emergency Department    History     Chief Complaint   Patient presents with    Chest Pain Angina     Stated Complaint: chest pain/SOB     HPI    79-year-old female with past medical history of asthma, hypertension presenting  for evaluation of left-sided chest discomfort described as burning/stabbing/pressure notably worse with movement and inspiration with radiation to LUE.  Symptoms associated with wheezing and improved upon exertion.  No rash.  No fevers or chills, no cough.  No tearing sensation, no focal weakness or paresthesias,no  back/abdominal pain.  No prior history of similar pain, no prior ischemia/provocative evaluation.  No personal/family history of CAD/VTE.  No lower extremity swelling or tenderness, hemoptysis, exogenous estrogen use. No smoking history.    Past Medical History[1]    Past Surgical History[2]         Family History[3]    Short Social Hx on File[4]    Review of Systems :  Constitutional: As per HPI  Respiratory: Negative for cough; (+) wheezing/shortness of breath.    Cardiovascular: (+) chest pain.    Positive for stated complaint: chest pain/SOB  Other systems are as noted in HPI.  Constitutional and vital signs reviewed.      All other systems reviewed and negative except as noted above.    PSFH elements reviewed from today and agreed except as otherwise stated in HPI.    Physical Exam     ED Triage Vitals [05/08/25 1335]   BP (!) 154/95   Pulse 88   Resp 22   Temp 98.2 °F (36.8 °C)   Temp src Temporal   SpO2 95 %   O2 Device None (Room air)       Current:BP (!) 154/95   Pulse 88   Temp 98.2 °F (36.8 °C) (Temporal)   Resp 22   Ht 154.9 cm (5' 1\")   Wt 66.7 kg   LMP  (LMP Unknown)   SpO2 95%   BMI 27.78 kg/m²         Physical Exam   Constitutional: Tachypneic.  HEENT: MMM.  Head: Normocephalic.   Eyes: No injection.   Cardiovascular: RRR. BUE with 2+ radial pulses.  Pulmonary/Chest: Tachypneic with diffuse expiratory wheezing.  Abdominal:  Soft. Nontender.  Musculoskeletal: No gross deformity. BLE without calf tenderness or palpable cord.  Neurological: Alert.   Skin: Skin is warm.   Psychiatric: Cooperative.  Nursing note and vitals reviewed.        ED Course     Labs Reviewed   CBC WITH DIFFERENTIAL WITH PLATELET - Abnormal; Notable for the following components:       Result Value    RDW-SD 47.5 (*)     Monocyte Absolute 1.09 (*)     All other components within normal limits   TROPONIN I HIGH SENSITIVITY - Abnormal; Notable for the following components:    Troponin I (High Sensitivity) 8,078 (*)     All other components within normal limits   D-DIMER - Normal   PTT, ACTIVATED - Normal   COMP METABOLIC PANEL (14)   BASIC METABOLIC PANEL (8)   TROPONIN I HIGH SENSITIVITY   LIPID PANEL   PTT, ACTIVATED   TROPONIN I HIGH SENSITIVITY   TYPE AND SCREEN    Narrative:     The following orders were created for panel order Type and screen.  Procedure                               Abnormality         Status                     ---------                               -----------         ------                     ABORH (Blood Type)[[id number]]                               In process                 Antibody Screen[[id number]]                                  In process                   Please view results for these tests on the individual orders.   ABORH (BLOOD TYPE)   ANTIBODY SCREEN   RAINBOW DRAW LAVENDER   RAINBOW DRAW BLUE   RAINBOW DRAW LIGHT GREEN      EKG (IC)    Rate, intervals and axes as noted on EKG Report.  Rate: 82   Rhythm: NSR   Reading: NSR 82 without AMI as independently interpreted by myself          EKG    Rate, intervals and axes as noted on EKG Report.  Rate: 83  Rhythm: Sinus Rhythm  Reading: NSR 83 withotu AMI and lateral STD without prior for comparison as indepennetly interpreted by myself     EKG (2hr)    Rate, intervals and axes as noted on EKG Report.  Rate: 87   Rhythm: NSR   Reading: NSR 87 without AMI as independently interpreted  by myself               XR CHEST AP PORTABLE  (CPT=71045)  Result Date: 5/8/2025  PROCEDURE: XR CHEST AP PORTABLE  (CPT=71045)  COMPARISON: None.  INDICATIONS: Asthma attack for 1 day.  Findings and impression:  Heart is magnified by technique.  No significant edema  No consolidation, but limited lung base evaluation  No pneumothorax Finalized by (CST): Chito Zamorano MD on 5/08/2025 at 2:36 PM          ED Course as of 05/08/25 1633  ------------------------------------------------------------  Time: 05/08 1632  Comment: Respiratory status improving, patient pain free for which ASA/heparin/NTG to be initiated - d/w MCI/pulmonary with plan for cath lab.       MDM   DIFFERENTIAL DIAGNOSIS: After history and physical exam differential diagnosis includes but is not limited to Kounis syndrome, ACS, myocarditis, VTE, anemia, electrolyte derangement, pneumothorax, pneumomediastinum.     Pulse ox: 95%:Normal on RA, as independently interpreted by myself    Cardiac Monitor Interpretation:   Pulse Readings from Last 1 Encounters:   05/08/25 88   , sinus,      Medical Decision Making  Evaluation chest pain in setting of asthma exacerbation with hypoxia without exertional complaints, symptoms improved on exertion though worsened with position changes and on inspiration without VTE risk factors.  EKG with inferolateral ST depressions, initial troponin noted with improving respiratory status without pain for which aspirin/heparin/nitroglycerin initiated and patient to be admitted with plan for urgent angiographic evaluation after discussion with cardiology.  Case discussed with hospitalist Dr. Yuen for admission, cardiology Dr. Palumbo in consultation and pulmonary Dr. Stewart.    Problems Addressed:  Acute asthma (HCC): acute illness or injury  Acute chest pain: acute illness or injury  NSTEMI (non-ST elevated myocardial infarction) (HCC): complicated acute illness or injury with systemic symptoms    Amount and/or Complexity  of Data Reviewed  External Data Reviewed: ECG and notes.     Details: IC note/EKG reviewed  Labs: ordered. Decision-making details documented in ED Course.  Radiology: ordered and independent interpretation performed. Decision-making details documented in ED Course.     Details: CXR without obvious pneumothorax as independently interpreted by myself    ECG/medicine tests: ordered and independent interpretation performed. Decision-making details documented in ED Course.  Discussion of management or test interpretation with external provider(s): Case d/w MCI Dr. Palumbo, hospitalist Dr. Yuen, pulmonary/intensivist Dr. Stewart    Risk  Prescription drug management.  Decision regarding hospitalization.    Critical Care  Total time providing critical care: 31 minutes      A total of 31 minutes of critical care time (exclusive of billable procedures) was administered to manage the patient's critical lab values and cardiovascular instability due to her non-ST elevation myocardial infarction.  This involved direct patient intervention, complex decision making, and/or extensive discussions with the patient, family, and clinical staff.      I was wearing at minimum a facemask and eye protection throughout this encounter with handwashing performed prior and after patient evaluation without personal hand/facial/oropharyngeal contact and gloves worn throughout encounter. See note and/or contact this provider for further PPE details.      Disposition and Plan     Clinical Impression:  1. Acute chest pain    2. NSTEMI (non-ST elevated myocardial infarction) (HCC)    3. Acute asthma (HCC)        Disposition:  Admit    Follow-up:  No follow-up provider specified.    Medications Prescribed:  Current Discharge Medication List                   [1]   Past Medical History:   Allergic rhinitis    molds, dust, weed, feather    Essential hypertension    Thyroid disease   [2]   Past Surgical History:  Procedure Laterality Date     Appendectomy  09/2009    Cholecystectomy      Tonsillectomy Left 03/1993   [3]   Family History  Problem Relation Age of Onset    Ear Problems Other     Cancer Other     Thyroid disease Other    [4]   Social History  Socioeconomic History    Marital status:    Tobacco Use    Smoking status: Former    Smokeless tobacco: Never   Vaping Use    Vaping status: Never Used   Substance and Sexual Activity    Alcohol use: Yes     Comment: rarely    Drug use: No

## 2025-05-08 NOTE — PROCEDURES
Cardiologist: Saeed Easton MD  Primary Proceduralist: Saeed Easton MD  Procedure Performed: LHC, COR, and LV  Date of Procedure: 5/8/2025     Pre procedure diagnosis: Non-ST elevation MI  Post procedure diagnosis: As above    Summary of findings: Mild to moderate disease of the left system.  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  Normal LVEDP.  Mild hypokinesis of inferior wall.      Post procedure findings:  1) Left Ventriculography at 30 degrees WONG: LVEF: 50% with mild hypokinesis of basal inferolateral segment.  2) Hemodynamics: LVEDP: 12 mmHg, mild gradient across aortic valve.  3) Coronaries:  Left main is patent, heavily calcified and has 20 to 30% stenosis in the ostium and free of obstructive disease  LAD is heavily calcified, patent and free of obstructive disease, supplies 2 diagonals which are non-obstructive  Cx is patent heavily calcified and free of obstructive disease, supplies 2 OM branches which are patent  RCA is occluded at the ostium.  No antegrade flow seen.  Right coronary is visualized with left-to-right collaterals filling all the way to the ostium.      Recommendations:  Viability for the inferior wall, based on LV angiogram it appears that the inferior wall is viable.  Would need retrograde intervention of the right coronary artery due to flush occlusion at the ostium.    Summary of Case: After written informed consent was obtained from the patient, patient was brought to the cardiac catheterization laboratory.  Patient was prepped and draped in the usual sterile fashion. Lidocaine 1% was used to infiltrate the right groin for local anesthesia and a 6 Sami introducer sheath was inserted into the right femoral artery.      Selective coronary angiography performed with JR4 catheter for RCA and JL4 catheter for LCA.  Angiography performed in standard projections.          Specimen sent to: No specimen collected  Estimated blood  loss: 10 cc  Closure: Perclose      IV was maintained by RN and moderate conscious sedation of versed and fentanyl was given.  Patient was assessed and monitoring of oxygen, heart rate and blood pressure by nurse and myself during the exam 25 minutes.      Saeed Easton MD  05/08/25

## 2025-05-09 ENCOUNTER — APPOINTMENT (OUTPATIENT)
Dept: CV DIAGNOSTICS | Facility: HOSPITAL | Age: 80
End: 2025-05-09
Payer: MEDICARE

## 2025-05-09 LAB
ANION GAP SERPL CALC-SCNC: 7 MMOL/L (ref 0–18)
APTT PPP: 26.8 SECONDS (ref 23–36)
APTT PPP: 38.3 SECONDS (ref 23–36)
APTT PPP: 44.3 SECONDS (ref 23–36)
APTT PPP: 55.8 SECONDS (ref 23–36)
ATRIAL RATE: 300 BPM
BUN BLD-MCNC: 14 MG/DL (ref 9–23)
BUN/CREAT SERPL: 24.1 (ref 10–20)
CALCIUM BLD-MCNC: 8.9 MG/DL (ref 8.7–10.4)
CHLORIDE SERPL-SCNC: 103 MMOL/L (ref 98–112)
CO2 SERPL-SCNC: 31 MMOL/L (ref 21–32)
CREAT BLD-MCNC: 0.58 MG/DL (ref 0.55–1.02)
EGFRCR SERPLBLD CKD-EPI 2021: 93 ML/MIN/1.73M2 (ref 60–?)
EST. AVERAGE GLUCOSE BLD GHB EST-MCNC: 123 MG/DL (ref 68–126)
GLUCOSE BLD-MCNC: 124 MG/DL (ref 70–99)
GLUCOSE BLDC GLUCOMTR-MCNC: 110 MG/DL (ref 70–99)
GLUCOSE BLDC GLUCOMTR-MCNC: 92 MG/DL (ref 70–99)
HBA1C MFR BLD: 5.9 % (ref ?–5.7)
OSMOLALITY SERPL CALC.SUM OF ELEC: 294 MOSM/KG (ref 275–295)
P AXIS: 87 DEGREES
POTASSIUM SERPL-SCNC: 3.6 MMOL/L (ref 3.5–5.1)
Q-T INTERVAL: 388 MS
QRS DURATION: 98 MS
QTC CALCULATION (BEZET): 466 MS
R AXIS: 54 DEGREES
SODIUM SERPL-SCNC: 141 MMOL/L (ref 136–145)
T AXIS: 141 DEGREES
VENTRICULAR RATE: 87 BPM

## 2025-05-09 PROCEDURE — 93306 TTE W/DOPPLER COMPLETE: CPT

## 2025-05-09 PROCEDURE — 76376 3D RENDER W/INTRP POSTPROCES: CPT

## 2025-05-09 PROCEDURE — 99233 SBSQ HOSP IP/OBS HIGH 50: CPT | Performed by: HOSPITALIST

## 2025-05-09 PROCEDURE — 99223 1ST HOSP IP/OBS HIGH 75: CPT | Performed by: INTERNAL MEDICINE

## 2025-05-09 RX ORDER — CLOPIDOGREL BISULFATE 75 MG/1
75 TABLET ORAL DAILY
Status: DISCONTINUED | OUTPATIENT
Start: 2025-05-09 | End: 2025-05-12

## 2025-05-09 RX ORDER — ISOSORBIDE MONONITRATE 30 MG/1
30 TABLET, EXTENDED RELEASE ORAL DAILY
Status: DISCONTINUED | OUTPATIENT
Start: 2025-05-09 | End: 2025-05-12

## 2025-05-09 RX ORDER — MAGNESIUM OXIDE 400 MG/1
400 TABLET ORAL DAILY
Status: DISCONTINUED | OUTPATIENT
Start: 2025-05-09 | End: 2025-05-12

## 2025-05-09 RX ORDER — LORAZEPAM 2 MG/ML
0.5 INJECTION INTRAMUSCULAR
Status: ACTIVE | OUTPATIENT
Start: 2025-05-10 | End: 2025-05-10

## 2025-05-09 RX ORDER — BUTALBITAL, ACETAMINOPHEN AND CAFFEINE 50; 325; 40 MG/1; MG/1; MG/1
1 TABLET ORAL EVERY 4 HOURS PRN
Status: DISCONTINUED | OUTPATIENT
Start: 2025-05-09 | End: 2025-05-12

## 2025-05-09 RX ORDER — FLUTICASONE PROPIONATE AND SALMETEROL 500; 50 UG/1; UG/1
1 POWDER RESPIRATORY (INHALATION) 2 TIMES DAILY
Status: DISCONTINUED | OUTPATIENT
Start: 2025-05-09 | End: 2025-05-12

## 2025-05-09 RX ORDER — ALBUTEROL SULFATE 5 MG/ML
5 SOLUTION RESPIRATORY (INHALATION) EVERY 6 HOURS PRN
Status: DISCONTINUED | OUTPATIENT
Start: 2025-05-09 | End: 2025-05-10

## 2025-05-09 RX ORDER — PREDNISONE 20 MG/1
40 TABLET ORAL
Status: DISCONTINUED | OUTPATIENT
Start: 2025-05-10 | End: 2025-05-11

## 2025-05-09 NOTE — PLAN OF CARE
Problem: SAFETY ADULT - FALL  Goal: Free from fall injury  Description: INTERVENTIONS:- Assess pt frequently for physical needs- Identify cognitive and physical deficits and behaviors that affect risk of falls.- [place] fall precautions as indicated by assessment.- Educate pt/family on patient safety including physical limitations- Instruct pt to call for assistance with activity based on assessment- Modify environment to reduce risk of injury- Provide assistive devices as appropriate- Consider OT/PT consult to assist with strengthening/mobility- Encourage toileting schedule  Outcome: Progressing     Problem: DISCHARGE PLANNING  Goal: Discharge to home or other facility with appropriate resources  Description: INTERVENTIONS:- Identify barriers to discharge w/pt and caregiver- Include patient/family/discharge partner in discharge planning- Arrange for needed discharge resources and transportation as appropriate- Identify discharge learning needs (meds, wound care, etc)- Arrange for interpreters to assist at discharge as needed- Consider post-discharge preferences of patient/family/discharge partner- Complete POLST form as appropriate- Assess patient's ability to be responsible for managing their own health- Refer to Case Management Department for coordinating discharge planning if the patient needs post-hospital services based on physician/LIP order or complex needs related to functional status, cognitive ability or social support system  Outcome: Progressing     Problem: CARDIOVASCULAR - ADULT  Goal: Maintains optimal cardiac output and hemodynamic stability  Description: INTERVENTIONS:- Monitor vital signs, rhythm, and trends- Monitor for bleeding, hypotension and signs of decreased cardiac output- Evaluate effectiveness of vasoactive medications to optimize hemodynamic stability- Monitor arterial and/or venous puncture sites for bleeding and/or hematoma- Assess quality of pulses, skin color and temperature-  Assess for signs of decreased coronary artery perfusion - ex. Angina- Evaluate fluid balance, assess for edema, trend weights  Outcome: Progressing  Goal: Absence of cardiac arrhythmias or at baseline  Description: INTERVENTIONS:- Continuous cardiac monitoring, monitor vital signs, obtain 12 lead EKG if indicated- Evaluate effectiveness of antiarrhythmic and heart rate control medications as ordered- Initiate emergency measures for life threatening arrhythmias- Monitor electrolytes and administer replacement therapy as ordered  Outcome: Progressing     Problem: METABOLIC/FLUID AND ELECTROLYTES - ADULT  Goal: Glucose maintained within prescribed range  Description: INTERVENTIONS:- Monitor Blood Glucose as ordered- Assess for signs and symptoms of hyperglycemia and hypoglycemia- Administer ordered medications to maintain glucose within target range- Assess barriers to adequate nutritional intake and initiate nutrition consult as needed- Instruct patient on self management of diabetes  Outcome: Progressing  Goal: Electrolytes maintained within normal limits  Description: INTERVENTIONS:- Monitor labs and rhythm and assess patient for signs and symptoms of electrolyte imbalances- Administer electrolyte replacement as ordered- Monitor response to electrolyte replacements, including rhythm and repeat lab results as appropriate- Fluid restriction as ordered- Instruct patient on fluid and nutrition restrictions as appropriate  Outcome: Progressing     Problem: Patient Centered Care  Goal: Patient preferences are identified and integrated in the patient's plan of care  Description: Interventions:- What would you like us to know as we care for you? - Provide timely, complete, and accurate information to patient/family- Incorporate patient and family knowledge, values, beliefs, and cultural backgrounds into the planning and delivery of care- Encourage patient/family to participate in care and decision-making at the level they  choose- Honor patient and family perspectives and choices  Outcome: Progressing     Problem: Patient/Family Goals  Goal: Patient/Family Long Term Goal  Description: Patient's Long Term Goal: Interventions:-  See additional Care Plan goals for specific interventions  Outcome: Progressing  Goal: Patient/Family Short Term Goal  Description: Patient's Short Term Goal: Interventions: -  See additional Care Plan goals for specific interventions  Outcome: Progressing

## 2025-05-09 NOTE — PROGRESS NOTES
[facility]  part of [facility]    Progress Note    Sophia Almanzar Patient Status:  Inpatient    10/25/1945 MRN [id number]   Location [facility] 3W/SW Attending Erin Barry MD   Hosp Day # 1 PCP SOPHIA DICKERSON MD     Chief complaint: wheezing  Subjective:     Pt reports feeling better overall, however reports intermittent wheezing that she attributes to metoprolol. She takes metoprolol at home bid with occasional wheeze that is going away within hr or so.  More sob while wheezing  C/o headache building up  No clear CP  No fevers      Objective:   Blood pressure 118/55, pulse 67, temperature 98.2 °F (36.8 °C), temperature source Oral, resp. rate 16, height 5' 1\" (1.549 m), weight 139 lb 15.9 oz (63.5 kg), SpO2 93%.    HEENT: conjunctivae/corneas clear. PERRL, EOM's intact.  Neck: no adenopathy, no carotid bruit, no JVD, supple  Pulmonary:  clear to auscultation bilaterally, no wheezing curently  Cardiovascular: S1, S2 normal, no murmur, click, rub or gallop, regular rate and rhythm  Abdominal: soft, non-tender; bowel sounds normal; no masses,  no organomegaly  Extremities: no cyanosis or edema  Pulses: palpable and symmetric  Skin: No rashes or lesions  Neurologic: Alert and oriented X 3, normal strength, coordination and gait  Psychiatric: calm, cooperative    Assessment and Plan:     Non-ST-elevation myocardial infarction.  -initial trops >8,000  -likely late presentation  -cardiology consulted, seen by Dr. Palumbo, then  underwent emergent LHC with Dr. Easton  showing: Mild to moderate disease of the left system. 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 Normal LVEDP. Mild hypokinesis of inferior wall.   -->recommend: Viability for the inferior wall, based on LV angiogram it appears that the inferior wall is viable. Would need retrograde intervention of the right coronary artery due to flush occlusion at the ostium.   -MRI  pending  -ECHO pending  -tele  -cont BB for now--> pt reports intermittent wheezing that she attributes to metoprolol. She takes metoprolol at home bid with occasional wheeze that is going away within hr or so.  -heparin ggt  -s/p  mg in ER (in the past no issues with ASA 81 mg, but stopped taking >10 yrs ago)  -plavix ordered per cardiology  -NTG prn  -allergy to NSAIDs (naproxen), also to ACEI (lisinopril), amlodipine, diltiazem    Mild asthma exacerbation.  -restart inhalers    Essential hypertension.   -BP ok, monitor    Severe anxiety disorder.  RLS  -cont meds, supportive    A1c 5.9    Abn LFT, mild  -repeat    HA-->supportive       Dispo: home once stable        Supplementary Documentation:   DVT Mechanical Prophylaxis:        DVT Pharmacologic Prophylaxis   Medication    heparin (Porcine) 62541 units/250mL infusion ACS/AFIB CONTINUOUS                Code Status: Not on file  Pringle: No urinary catheter in place  Pringle Duration (in days):   Central line:    MARISOL: 5/9/2025        **Certification      PHYSICIAN Certification of Need for Inpatient Hospitalization - Initial Certification    Patient will require inpatient services that will reasonably be expected to span two midnight's based on the clinical documentation in H+P.   Based on patients current state of illness, I anticipate that, after discharge, patient will require TBD.                Chart reviewed, including current vitals, notes, labs and imaging  Pertinent past medical records reviewed  Labs ordered and medications adjusted as outlined above  Home medications reconciliation completed  Coordinate care with care team/consultants  Discussed with patient  at bedside results of tests, management plan as outlined above, and the need for ongoing hospitalization  D/w RN     MDM high  severe acute illness/or exacerbation of chronic illness posing a threat to life, IV medications, requiring close monitoring in hospital.       Results:     Lab Results    Component Value Date    WBC 9.9 05/08/2025    HGB 12.6 05/08/2025    HCT 39.1 05/08/2025    .0 05/08/2025    CREATSERUM 0.58 05/09/2025    BUN 14 05/09/2025     05/09/2025    K 3.6 05/09/2025     05/09/2025    CO2 31.0 05/09/2025     (H) 05/09/2025    CA 8.9 05/09/2025    ALB 4.9 (H) 05/08/2025    ALKPHO 87 05/08/2025    BILT 0.8 05/08/2025    TP 7.2 05/08/2025     (H) 05/08/2025    ALT 92 (H) 05/08/2025    PTT 38.3 (H) 05/09/2025    DDIMER 0.62 05/08/2025    MG 2.5 05/08/2025       No results found.  EKG 12 Lead  Result Date: 5/9/2025  Atrial flutter Minimal voltage criteria for LVH, may be normal variant ( [place] product ) Marked ST abnormality, possible lateral subendocardial injury Abnormal ECG When compared with ECG of 08-MAY-2025 13:32, Atrial flutter has replaced Sinus rhythm T wave inversion now evident in Lateral leads    EKG 12 Lead  Result Date: 5/8/2025  Normal sinus rhythm Possible Left atrial enlargement Nonspecific ST and T wave abnormality Abnormal ECG When compared with ECG of 08-MAY-2025 12:47, No significant change was found Confirmed by YANA BENSON ([id number]) on 5/8/2025 4:11:40 PM    EKG 12 Lead  Result Date: 5/8/2025  Normal sinus rhythm Possible Left atrial enlargement ST & T wave abnormality, consider lateral ischemia Prolonged QT interval or tu fusion, consider myocardial disease, electrolyte imbalance, or drug effects Abnormal ECG No previous ECGs found in Muse Confirmed by YANA BENSON ([id number]) on 5/8/2025 4:11:13 PM        NORMA MUELLER MD  5/9/2025

## 2025-05-09 NOTE — CONSULTS
[facility]  part of [facility]     Progress Note    Sophia Almanzar Patient Status:  Inpatient    10/25/1945 MRN [id number]   Location [facility] 3W/SW Attending Erin Barry MD   Hosp Day # 1 PCP SOPHIA DICKERSON MD       Subjective:   Patient is a 79-year-old female with past medical history significant for asthma, hypertension who presents with chief complaint of increased dyspnea and wheezing.  States asthma symptoms generally well-controlled.  Admits to some mild improvement dyspnea and wheezing since arrival to hospital.  Denies frequent use of maintenance inhaler therapy at baseline.  Denies fevers or chills.  Patient admits to worsening asthma symptoms after metoprolol administration.    Objective:   Blood pressure 118/55, pulse 67, temperature 98.2 °F (36.8 °C), temperature source Oral, resp. rate 16, height 5' 1\" (1.549 m), weight 139 lb 15.9 oz (63.5 kg), SpO2 93%.  Intake/Output:   Last 3 shifts: I/O last 3 completed shifts:  In: 522 [P.O.:360; I.V.:112; IV PIGGYBACK:50]  Out: 0    This shift: No intake/output data recorded.     Vent Settings:      Hemodynamic parameters (last 24 hours):      Scheduled Meds: Current Hospital Medications[1]    Continuous Infusions: Medication Infusions[2]    Physical Exam  Constitutional: no acute distress  Eyes: PERRL  ENT: nares pateint  Neck: supple, no JVD  Cardio: RRR, S1 S2  Respiratory: Faint expiratory Rales  GI: abdomen soft, non tender, active bowel sounds, no organomegaly  Extremities: no clubbing, cyanosis, edema  Neurologic: no gross motor deficits  Skin: warm, dry      Results:     Lab Results   Component Value Date    WBC 9.9 2025    HGB 12.6 2025    HCT 39.1 2025    .0 2025    CREATSERUM 0.58 2025    BUN 14 2025     2025    K 3.6 2025     2025    CO2 31.0 2025     2025    CA 8.9 2025    ALB 4.9 2025    ALKPHO 87  05/08/2025    BILT 0.8 05/08/2025    TP 7.2 05/08/2025     05/08/2025    ALT 92 05/08/2025    PTT 38.3 05/09/2025    DDIMER 0.62 05/08/2025    MG 2.5 05/08/2025       No results found.    EKG 12 Lead  Result Date: 5/9/2025  Normal sinus rhythm Minimal voltage criteria for LVH, may be normal variant ( [place] product ) Marked ST abnormality, possible lateral subendocardial injury Abnormal ECG When compared with ECG of 08-MAY-2025 13:32, T wave inversion now evident in Lateral leads Confirmed by DONNA PEREZ ([id number]) on 5/9/2025 12:46:41 PM    EKG 12 Lead  Result Date: 5/8/2025  Normal sinus rhythm Possible Left atrial enlargement Nonspecific ST and T wave abnormality Abnormal ECG When compared with ECG of 08-MAY-2025 12:47, No significant change was found Confirmed by YANA BENSON ([id number]) on 5/8/2025 4:11:40 PM    EKG 12 Lead  Result Date: 5/8/2025  Normal sinus rhythm Possible Left atrial enlargement ST & T wave abnormality, consider lateral ischemia Prolonged QT interval or tu fusion, consider myocardial disease, electrolyte imbalance, or drug effects Abnormal ECG No previous ECGs found in Muse Confirmed by YANA BENSON ([id number]) on 5/8/2025 4:11:13 PM      Assessment   1.  Non-ST elevation myocardial infarction  2.  Asthma with mild exacerbation  3.  Hypertension     Plan   -Patient presents with evidence of dyspnea may be secondary to mild asthma exacerbation.  Elevated troponin on presentation.  - Status post left heart cath with mild to moderate disease seen.  - Await 2D echo results  - Further conditions per cardiology  - ICS/LABA  - Gradually wean airway therapy  -Patient concerned about worsening symptoms while using metoprolol.  Will need to discuss with cardiology  - Reviewed vitals, labs and imaging    Vega Stewart DO  Pulmonary Critical Care Medicine  Formerly [facility]        [1]   Current Facility-Administered Medications   Medication Dose Route Frequency    clopidogrel (Plavix) tab 75 mg  75  mg Oral Daily    butalbital-acetaminophen-caffeine (Fioricet) -40 MG per tab 1 tablet  1 tablet Oral Q4H PRN    heparin (Porcine) 72142 units/250mL infusion ACS/AFIB CONTINUOUS  200-3,000 Units/hr Intravenous Continuous    acetaminophen (Tylenol Extra Strength) tab 500 mg  500 mg Oral Q4H PRN    ondansetron (Zofran) 4 MG/2ML injection 4 mg  4 mg Intravenous Q6H PRN    metoclopramide (Reglan) 5 mg/mL injection 10 mg  10 mg Intravenous Q8H PRN    temazepam (Restoril) cap 15 mg  15 mg Oral Nightly PRN    atorvastatin (Lipitor) tab 40 mg  40 mg Oral Nightly    metoprolol tartrate (Lopressor) tab 25 mg  25 mg Oral 2x Daily(Beta Blocker)    escitalopram (Lexapro) tab 5 mg  5 mg Oral Daily    HYDROcodone-acetaminophen (Norco) 5-325 MG per tab 1 tablet  1 tablet Oral Q6H PRN    latanoprost (Xalatan) 0.005 % ophthalmic solution 1 drop  1 drop Both Eyes Nightly    rOPINIRole (Requip) tab 2 mg  2 mg Oral Nightly    ALPRAZolam (Xanax) tab 0.25 mg  0.25 mg Oral Nightly   [2]    continuous dose heparin 900 Units/hr (05/09/25 0728)

## 2025-05-09 NOTE — PLAN OF CARE
Patient alert. Denies pain. Noted ambulating well in room. Remains on heparin gtt. Provided new med education on isosorbide. Echo completed. Pending cardiac MRI. POC updated to patient.     Problem: SAFETY ADULT - FALL  Goal: Free from fall injury  Description: INTERVENTIONS:- Assess pt frequently for physical needs- Identify cognitive and physical deficits and behaviors that affect risk of falls.- [place] fall precautions as indicated by assessment.- Educate pt/family on patient safety including physical limitations- Instruct pt to call for assistance with activity based on assessment- Modify environment to reduce risk of injury- Provide assistive devices as appropriate- Consider OT/PT consult to assist with strengthening/mobility- Encourage toileting schedule  Outcome: Progressing     Problem: DISCHARGE PLANNING  Goal: Discharge to home or other facility with appropriate resources  Description: INTERVENTIONS:- Identify barriers to discharge w/pt and caregiver- Include patient/family/discharge partner in discharge planning- Arrange for needed discharge resources and transportation as appropriate- Identify discharge learning needs (meds, wound care, etc)- Arrange for interpreters to assist at discharge as needed- Consider post-discharge preferences of patient/family/discharge partner- Complete POLST form as appropriate- Assess patient's ability to be responsible for managing their own health- Refer to Case Management Department for coordinating discharge planning if the patient needs post-hospital services based on physician/LIP order or complex needs related to functional status, cognitive ability or social support system  Outcome: Progressing     Problem: CARDIOVASCULAR - ADULT  Goal: Maintains optimal cardiac output and hemodynamic stability  Description: INTERVENTIONS:- Monitor vital signs, rhythm, and trends- Monitor for bleeding, hypotension and signs of decreased cardiac output- Evaluate effectiveness of  vasoactive medications to optimize hemodynamic stability- Monitor arterial and/or venous puncture sites for bleeding and/or hematoma- Assess quality of pulses, skin color and temperature- Assess for signs of decreased coronary artery perfusion - ex. Angina- Evaluate fluid balance, assess for edema, trend weights  Outcome: Progressing  Goal: Absence of cardiac arrhythmias or at baseline  Description: INTERVENTIONS:- Continuous cardiac monitoring, monitor vital signs, obtain 12 lead EKG if indicated- Evaluate effectiveness of antiarrhythmic and heart rate control medications as ordered- Initiate emergency measures for life threatening arrhythmias- Monitor electrolytes and administer replacement therapy as ordered  Outcome: Progressing     Problem: METABOLIC/FLUID AND ELECTROLYTES - ADULT  Goal: Glucose maintained within prescribed range  Description: INTERVENTIONS:- Monitor Blood Glucose as ordered- Assess for signs and symptoms of hyperglycemia and hypoglycemia- Administer ordered medications to maintain glucose within target range- Assess barriers to adequate nutritional intake and initiate nutrition consult as needed- Instruct patient on self management of diabetes  Outcome: Progressing  Goal: Electrolytes maintained within normal limits  Description: INTERVENTIONS:- Monitor labs and rhythm and assess patient for signs and symptoms of electrolyte imbalances- Administer electrolyte replacement as ordered- Monitor response to electrolyte replacements, including rhythm and repeat lab results as appropriate- Fluid restriction as ordered- Instruct patient on fluid and nutrition restrictions as appropriate  Outcome: Progressing     Problem: PAIN - ADULT  Goal: Verbalizes/displays adequate comfort level or patient's stated pain goal  Description: INTERVENTIONS:- Encourage pt to monitor pain and request assistance- Assess pain using appropriate pain scale- Administer analgesics based on type and severity of pain and  evaluate response- Implement non-pharmacological measures as appropriate and evaluate response- Consider cultural and social influences on pain and pain management- Manage/alleviate anxiety- Utilize distraction and/or relaxation techniques- Monitor for opioid side effects- Notify MD/LIP if interventions unsuccessful or patient reports new pain- Anticipate increased pain with activity and pre-medicate as appropriate  Outcome: Progressing

## 2025-05-09 NOTE — PROGRESS NOTES
Pt alert and oriented x4. Independent. Cath completed, R groin site is clean and dry. Safety precautions in  place. Call light within reach. Report given to Mireya HOWARD.

## 2025-05-10 LAB
ALBUMIN SERPL-MCNC: 4.1 G/DL (ref 3.2–4.8)
ALBUMIN/GLOB SERPL: 2.1 {RATIO} (ref 1–2)
ALP LIVER SERPL-CCNC: 68 U/L (ref 55–142)
ALT SERPL-CCNC: 50 U/L (ref 10–49)
ANION GAP SERPL CALC-SCNC: 5 MMOL/L (ref 0–18)
APTT PPP: 50.8 SECONDS (ref 23–36)
APTT PPP: 80.3 SECONDS (ref 23–36)
AST SERPL-CCNC: 42 U/L (ref ?–34)
ATRIAL RATE: 65 BPM
BILIRUB SERPL-MCNC: 0.5 MG/DL (ref 0.2–1.1)
BUN BLD-MCNC: 17 MG/DL (ref 9–23)
BUN/CREAT SERPL: 27.9 (ref 10–20)
CALCIUM BLD-MCNC: 8.7 MG/DL (ref 8.7–10.4)
CHLORIDE SERPL-SCNC: 105 MMOL/L (ref 98–112)
CO2 SERPL-SCNC: 33 MMOL/L (ref 21–32)
CREAT BLD-MCNC: 0.61 MG/DL (ref 0.55–1.02)
DEPRECATED RDW RBC AUTO: 48.2 FL (ref 35.1–46.3)
EGFRCR SERPLBLD CKD-EPI 2021: 91 ML/MIN/1.73M2 (ref 60–?)
ERYTHROCYTE [DISTWIDTH] IN BLOOD BY AUTOMATED COUNT: 13.7 % (ref 11–15)
GLOBULIN PLAS-MCNC: 2 G/DL (ref 2–3.5)
GLUCOSE BLD-MCNC: 89 MG/DL (ref 70–99)
HCT VFR BLD AUTO: 37.4 % (ref 35–48)
HGB BLD-MCNC: 12 G/DL (ref 12–16)
MAGNESIUM SERPL-MCNC: 2 MG/DL (ref 1.6–2.6)
MCH RBC QN AUTO: 30.5 PG (ref 26–34)
MCHC RBC AUTO-ENTMCNC: 32.1 G/DL (ref 31–37)
MCV RBC AUTO: 95.2 FL (ref 80–100)
OSMOLALITY SERPL CALC.SUM OF ELEC: 297 MOSM/KG (ref 275–295)
P AXIS: 62 DEGREES
P-R INTERVAL: 146 MS
PLATELET # BLD AUTO: 182 10(3)UL (ref 150–450)
POTASSIUM SERPL-SCNC: 3.7 MMOL/L (ref 3.5–5.1)
PROT SERPL-MCNC: 6.1 G/DL (ref 5.7–8.2)
Q-T INTERVAL: 466 MS
QRS DURATION: 90 MS
QTC CALCULATION (BEZET): 484 MS
R AXIS: 46 DEGREES
RBC # BLD AUTO: 3.93 X10(6)UL (ref 3.8–5.3)
SODIUM SERPL-SCNC: 143 MMOL/L (ref 136–145)
T AXIS: 213 DEGREES
VENTRICULAR RATE: 65 BPM
WBC # BLD AUTO: 7.7 X10(3) UL (ref 4–11)

## 2025-05-10 PROCEDURE — 99233 SBSQ HOSP IP/OBS HIGH 50: CPT | Performed by: HOSPITALIST

## 2025-05-10 PROCEDURE — 99232 SBSQ HOSP IP/OBS MODERATE 35: CPT | Performed by: INTERNAL MEDICINE

## 2025-05-10 RX ORDER — ALBUTEROL SULFATE 0.83 MG/ML
2.5 SOLUTION RESPIRATORY (INHALATION) EVERY 6 HOURS PRN
Status: DISCONTINUED | OUTPATIENT
Start: 2025-05-10 | End: 2025-05-12

## 2025-05-10 RX ORDER — ALBUTEROL SULFATE 0.83 MG/ML
SOLUTION RESPIRATORY (INHALATION)
Status: COMPLETED
Start: 2025-05-10 | End: 2025-05-10

## 2025-05-10 RX ORDER — FUROSEMIDE 10 MG/ML
20 INJECTION INTRAMUSCULAR; INTRAVENOUS ONCE
Status: COMPLETED | OUTPATIENT
Start: 2025-05-10 | End: 2025-05-10

## 2025-05-10 NOTE — PROGRESS NOTES
Progress Note  Sophia Almanzar Patient Status:  Inpatient    10/25/1945 MRN [id number]   Location [facility] 3W/SW Attending Erin Barry MD   Hosp Day # 2 PCP SOPHIA DICKERSON MD     Subjective:  No acute events overnight.  Sitting up in a chair, on O2 at 2L, denies any chest pain, SOB, palpitations, or dizziness at this time. Report lower extremities edema, overall positive fluid balance 1.5L.    Objective:  /64 (BP Location: Left arm)   Pulse 76   Temp 98.4 °F (36.9 °C) (Oral)   Resp 22   Ht 5' 1\" (1.549 m)   Wt 138 lb (62.6 kg)   LMP  (LMP Unknown)   SpO2 97%   BMI 26.07 kg/m²     Telemetry: SR, HR 70s      Intake/Output:    Intake/Output Summary (Last 24 hours) at 5/10/2025 1456  Last data filed at 5/10/2025 0931  Gross per 24 hour   Intake 782.96 ml   Output --   Net 782.96 ml       Last 3 Weights   05/10/25 0500 138 lb (62.6 kg)   25 2316 139 lb 15.9 oz (63.5 kg)   25 1609 139 lb 15.9 oz (63.5 kg)   25 1335 147 lb (66.7 kg)   10/08/24 1139 140 lb (63.5 kg)   24 1257 140 lb (63.5 kg)       Labs:  Recent Labs   Lab 25  1538 25  0641 05/10/25  0742   *  113* 124* 89   BUN 12  12 14 17   CREATSERUM 0.61  0.61 0.58 0.61   EGFRCR 91  91 93 91   CA 9.3  9.3 8.9 8.7     144 141 143   K 3.5  3.5 3.6 3.7     104 103 105   CO2 30.0  30.0 31.0 33.0*     Recent Labs   Lab 25  1423 25  1843 05/10/25  0742   RBC 4.45 4.18 3.93   HGB 13.6 12.6 12.0   HCT 42.4 39.1 37.4   MCV 95.3 93.5 95.2   MCH 30.6 30.1 30.5   MCHC 32.1 32.2 32.1   RDW 13.4 13.5 13.7   NEPRELIM 7.42 8.89*  --    WBC 10.3 9.9 7.7   .0 206.0 182.0         Recent Labs   Lab 25  1516 25  1538 25  1623   TROPHS 8,078* 8,888* 7,827*       Review of Systems   Constitutional: Negative.   Cardiovascular:  Positive for leg swelling.   Respiratory: Negative.         Physical Exam:    Gen: alert, oriented x 3, NAD  Heent: pupils equal,  reactive. Mucous membranes moist.   Neck: no jvd  Cardiac: regular rate and rhythm, normal S1,S2, 2/6 SHANICE, no gallop or rub   Lungs: CTA, diminished   Abd: soft, NT/ND +bs  Ext: =1 lower extremities edema  Skin: Warm, dry  Neuro: No focal deficits      Medications:    Scheduled Medications[1]  Medication Infusions[2]      Assessment:  NSTEMI s/p LHC 5/8/25 shoed mild-mod left system disease with flush occlusion of the RCA with established collaterals, will need retrograde intervention of the RCA d/t flush occlusion of the ostium.   On asa, statin, bb, heparin gtt  Echo 5/9/25 LVEF 55-60%, akinesis of the apical wall, grade 3 DD, severe low-flow low-gradient aortic stenosis, mod TR, PHTN    Severe aortic stenosis, low flow low gradient   HTN-controlled   HLP, on statin, LDL 66  Prediabetes, A1C 5.9%-per primary    Asthma     Plan:  Denies any further chest pain or palpitations.  Plan to dc heparin gtt this afternoon at 1600 to complete 48 hr therapy.  Echo results noted, preserved LVEF 55-60%, grade 3 DD, severe low flow  low gradient aortic stenosis-will review with Dr Eden.  Continue asa, plavix, bb, statin.  Plan for cardiac MRI on Monday to assess the viability of the inferior wall.    Mild volume overload-will order dose of lasix.  Wean off O2 as able.    Plan of care discussed with patient, RN.    MATTHEW Lilly  5/10/2025  2:56 PM  [phone]      Cardiology attending      Note reviewed and patient examined independently.  Agree with assessment and plan as above.         [1]    clopidogrel  75 mg Oral Daily    magnesium oxide  400 mg Oral Daily    fluticasone-salmeterol  1 puff Inhalation BID    predniSONE  40 mg Oral Daily with breakfast    isosorbide mononitrate ER  30 mg Oral Daily    atorvastatin  40 mg Oral Nightly    metoprolol tartrate  25 mg Oral 2x Daily(Beta Blocker)    escitalopram  5 mg Oral Daily    latanoprost  1 drop Both Eyes Nightly    rOPINIRole HCl  2 mg Oral Nightly     ALPRAZolam  0.25 mg Oral Nightly   [2]    continuous dose heparin 900 Units/hr (05/10/25 0936)

## 2025-05-10 NOTE — PLAN OF CARE
Moderate chest pain & wheezing sob at 2-3am. PRN nebs helped sob. EKG done for chest pain, vitals stable. Nell Alvarez [facility] notified, continue to monitor. Norco given for pain. Call light within reach, bed locked in lowest position.    Problem: SAFETY ADULT - FALL  Goal: Free from fall injury  Description: INTERVENTIONS:- Assess pt frequently for physical needs- Identify cognitive and physical deficits and behaviors that affect risk of falls.- [place] fall precautions as indicated by assessment.- Educate pt/family on patient safety including physical limitations- Instruct pt to call for assistance with activity based on assessment- Modify environment to reduce risk of injury- Provide assistive devices as appropriate- Consider OT/PT consult to assist with strengthening/mobility- Encourage toileting schedule  Outcome: Progressing     Problem: DISCHARGE PLANNING  Goal: Discharge to home or other facility with appropriate resources  Description: INTERVENTIONS:- Identify barriers to discharge w/pt and caregiver- Include patient/family/discharge partner in discharge planning- Arrange for needed discharge resources and transportation as appropriate- Identify discharge learning needs (meds, wound care, etc)- Arrange for interpreters to assist at discharge as needed- Consider post-discharge preferences of patient/family/discharge partner- Complete POLST form as appropriate- Assess patient's ability to be responsible for managing their own health- Refer to Case Management Department for coordinating discharge planning if the patient needs post-hospital services based on physician/LIP order or complex needs related to functional status, cognitive ability or social support system  Outcome: Progressing     Problem: CARDIOVASCULAR - ADULT  Goal: Maintains optimal cardiac output and hemodynamic stability  Description: INTERVENTIONS:- Monitor vital signs, rhythm, and trends- Monitor for bleeding, hypotension and signs of decreased  cardiac output- Evaluate effectiveness of vasoactive medications to optimize hemodynamic stability- Monitor arterial and/or venous puncture sites for bleeding and/or hematoma- Assess quality of pulses, skin color and temperature- Assess for signs of decreased coronary artery perfusion - ex. Angina- Evaluate fluid balance, assess for edema, trend weights  Outcome: Progressing  Goal: Absence of cardiac arrhythmias or at baseline  Description: INTERVENTIONS:- Continuous cardiac monitoring, monitor vital signs, obtain 12 lead EKG if indicated- Evaluate effectiveness of antiarrhythmic and heart rate control medications as ordered- Initiate emergency measures for life threatening arrhythmias- Monitor electrolytes and administer replacement therapy as ordered  Outcome: Progressing     Problem: METABOLIC/FLUID AND ELECTROLYTES - ADULT  Goal: Glucose maintained within prescribed range  Description: INTERVENTIONS:- Monitor Blood Glucose as ordered- Assess for signs and symptoms of hyperglycemia and hypoglycemia- Administer ordered medications to maintain glucose within target range- Assess barriers to adequate nutritional intake and initiate nutrition consult as needed- Instruct patient on self management of diabetes  Outcome: Progressing  Goal: Electrolytes maintained within normal limits  Description: INTERVENTIONS:- Monitor labs and rhythm and assess patient for signs and symptoms of electrolyte imbalances- Administer electrolyte replacement as ordered- Monitor response to electrolyte replacements, including rhythm and repeat lab results as appropriate- Fluid restriction as ordered- Instruct patient on fluid and nutrition restrictions as appropriate  Outcome: Progressing     Problem: Patient Centered Care  Goal: Patient preferences are identified and integrated in the patient's plan of care  Description: Interventions:- What would you like us to know as we care for you? - Provide timely, complete, and accurate information  to patient/family- Incorporate patient and family knowledge, values, beliefs, and cultural backgrounds into the planning and delivery of care- Encourage patient/family to participate in care and decision-making at the level they choose- Honor patient and family perspectives and choices  Outcome: Progressing     Problem: Patient/Family Goals  Goal: Patient/Family Long Term Goal  Description: Patient's Long Term Goal: Interventions: -See additional Care Plan goals for specific interventions  Outcome: Progressing  Goal: Patient/Family Short Term Goal  Description: Patient's Short Term Goal: Interventions: -  See additional Care Plan goals for specific interventions  Outcome: Progressing     Problem: PAIN - ADULT  Goal: Verbalizes/displays adequate comfort level or patient's stated pain goal  Description: INTERVENTIONS:- Encourage pt to monitor pain and request assistance- Assess pain using appropriate pain scale- Administer analgesics based on type and severity of pain and evaluate response- Implement non-pharmacological measures as appropriate and evaluate response- Consider cultural and social influences on pain and pain management- Manage/alleviate anxiety- Utilize distraction and/or relaxation techniques- Monitor for opioid side effects- Notify MD/LIP if interventions unsuccessful or patient reports new pain- Anticipate increased pain with activity and pre-medicate as appropriate  Outcome: Progressing

## 2025-05-10 NOTE — PROGRESS NOTES
[facility]  part of [facility]     Progress Note    Sophia Almanzar Patient Status:  Inpatient    10/25/1945 MRN [id number]   Location [facility] 3W/SW Attending Erin Barry MD   Hosp Day # 2 PCP SOPHIA DICKERSON MD       Subjective:   Patient seen examined.  Dyspnea slightly improved.  Occasional wheezing.    Objective:   Blood pressure 107/62, pulse 76, temperature 98.3 °F (36.8 °C), temperature source Oral, resp. rate 20, height 5' 1\" (1.549 m), weight 138 lb (62.6 kg), SpO2 96%.  Intake/Output:   Last 3 shifts: I/O last 3 completed shifts:  In: 1345 [P.O.:990; I.V.:355]  Out: 0    This shift: I/O this shift:  In: 60 [P.O.:60]  Out: -      Vent Settings:      Hemodynamic parameters (last 24 hours):      Scheduled Meds: Current Hospital Medications[1]    Continuous Infusions: Medication Infusions[2]    Physical Exam  Constitutional: no acute distress  Eyes: PERRL  ENT: nares pateint  Neck: supple, no JVD  Cardio: RRR, S1 S2  Respiratory: Faint Rales  GI: abdomen soft, non tender, active bowel sounds, no organomegaly  Extremities: no clubbing, cyanosis, edema  Neurologic: no gross motor deficits  Skin: warm, dry      Results:     Lab Results   Component Value Date    WBC 7.7 05/10/2025    HGB 12.0 05/10/2025    HCT 37.4 05/10/2025    .0 05/10/2025    CREATSERUM 0.61 05/10/2025    BUN 17 05/10/2025     05/10/2025    K 3.7 05/10/2025     05/10/2025    CO2 33.0 05/10/2025    GLU 89 05/10/2025    CA 8.7 05/10/2025    ALB 4.1 05/10/2025    ALKPHO 68 05/10/2025    BILT 0.5 05/10/2025    TP 6.1 05/10/2025    AST 42 05/10/2025    ALT 50 05/10/2025    PTT 80.3 05/10/2025    MG 2.0 05/10/2025       No results found.    EKG 12 Lead  Result Date: 5/10/2025  Normal sinus rhythm ST & T wave abnormality, consider anterolateral ischemia Prolonged QT interval or tu fusion, consider myocardial disease, electrolyte imbalance, or drug effects Abnormal ECG When compared with ECG  of 08-MAY-2025 16:23, T wave inversion more evident in Inferior leads T wave inversion now evident in Anterior leads    EKG 12 Lead  Result Date: 5/9/2025  Normal sinus rhythm Minimal voltage criteria for LVH, may be normal variant ( [place] product ) Marked ST abnormality, possible lateral subendocardial injury Abnormal ECG When compared with ECG of 08-MAY-2025 13:32, T wave inversion now evident in Lateral leads Confirmed by DONNA PEREZ ([id number]) on 5/9/2025 12:46:41 PM    EKG 12 Lead  Result Date: 5/8/2025  Normal sinus rhythm Possible Left atrial enlargement Nonspecific ST and T wave abnormality Abnormal ECG When compared with ECG of 08-MAY-2025 12:47, No significant change was found Confirmed by YANA BENSON ([id number]) on 5/8/2025 4:11:40 PM    EKG 12 Lead  Result Date: 5/8/2025  Normal sinus rhythm Possible Left atrial enlargement ST & T wave abnormality, consider lateral ischemia Prolonged QT interval or tu fusion, consider myocardial disease, electrolyte imbalance, or drug effects Abnormal ECG No previous ECGs found in [place] Confirmed by YANA BENSON ([id number]) on 5/8/2025 4:11:13 PM      Assessment   1.  Non-ST elevation myocardial infarction  2.  Asthma with mild exacerbation  3.  Hypertension     Plan   -Patient presents with evidence of dyspnea may be secondary to mild asthma exacerbation.  Elevated troponin on presentation.  - Status post left heart cath with mild to moderate disease seen.  - Started on heparin gtt.  - The echo with ejection fraction 55 to 60% akinesis of apical wall seen.  Grade 3 diastolic dysfunction.  Further recommendations per cardiology  - ICS/LABA  - Gradually wean steroid therapy  -Patient concerned about worsening symptoms while using metoprolol.  Will need to discuss with cardiology    Vega Stewart DO  Pulmonary Critical Care Medicine  [facility]        [1]   Current Facility-Administered Medications   Medication Dose Route Frequency    albuterol (Ventolin) (2.5 MG/3ML)  0.083% nebulizer solution 2.5 mg  2.5 mg Nebulization Q6H PRN    clopidogrel (Plavix) tab 75 mg  75 mg Oral Daily    butalbital-acetaminophen-caffeine (Fioricet) -40 MG per tab 1 tablet  1 tablet Oral Q4H PRN    magnesium oxide (Mag-Ox) tab 400 mg  400 mg Oral Daily    fluticasone-salmeterol (Advair Diskus) 500-50 MCG/ACT inhaler 1 puff  1 puff Inhalation BID    predniSONE (Deltasone) tab 40 mg  40 mg Oral Daily with breakfast    LORazepam (Ativan) 2 mg/mL injection 0.5 mg  0.5 mg Intravenous On Call    isosorbide mononitrate ER (Imdur) 24 hr tab 30 mg  30 mg Oral Daily    heparin (Porcine) 23967 units/250mL infusion ACS/AFIB CONTINUOUS  200-3,000 Units/hr Intravenous Continuous    acetaminophen (Tylenol Extra Strength) tab 500 mg  500 mg Oral Q4H PRN    ondansetron (Zofran) 4 MG/2ML injection 4 mg  4 mg Intravenous Q6H PRN    metoclopramide (Reglan) 5 mg/mL injection 10 mg  10 mg Intravenous Q8H PRN    temazepam (Restoril) cap 15 mg  15 mg Oral Nightly PRN    atorvastatin (Lipitor) tab 40 mg  40 mg Oral Nightly    metoprolol tartrate (Lopressor) tab 25 mg  25 mg Oral 2x Daily(Beta Blocker)    escitalopram (Lexapro) tab 5 mg  5 mg Oral Daily    HYDROcodone-acetaminophen (Norco) 5-325 MG per tab 1 tablet  1 tablet Oral Q6H PRN    latanoprost (Xalatan) 0.005 % ophthalmic solution 1 drop  1 drop Both Eyes Nightly    rOPINIRole (Requip) tab 2 mg  2 mg Oral Nightly    ALPRAZolam (Xanax) tab 0.25 mg  0.25 mg Oral Nightly   [2]    continuous dose heparin 900 Units/hr (05/10/25 0936)

## 2025-05-10 NOTE — PLAN OF CARE
Patient alert. Heparin dc'd today. X1 IV Lasix given per cards. Mild edema noted to BLE. Ambulation encouraged throughout shift. Denies SOB. Pending cardiac MRI Mon/Tues. POC updated to patient.     Problem: SAFETY ADULT - FALL  Goal: Free from fall injury  Description: INTERVENTIONS:- Assess pt frequently for physical needs- Identify cognitive and physical deficits and behaviors that affect risk of falls.- [place] fall precautions as indicated by assessment.- Educate pt/family on patient safety including physical limitations- Instruct pt to call for assistance with activity based on assessment- Modify environment to reduce risk of injury- Provide assistive devices as appropriate- Consider OT/PT consult to assist with strengthening/mobility- Encourage toileting schedule  Outcome: Progressing     Problem: DISCHARGE PLANNING  Goal: Discharge to home or other facility with appropriate resources  Description: INTERVENTIONS:- Identify barriers to discharge w/pt and caregiver- Include patient/family/discharge partner in discharge planning- Arrange for needed discharge resources and transportation as appropriate- Identify discharge learning needs (meds, wound care, etc)- Arrange for interpreters to assist at discharge as needed- Consider post-discharge preferences of patient/family/discharge partner- Complete POLST form as appropriate- Assess patient's ability to be responsible for managing their own health- Refer to Case Management Department for coordinating discharge planning if the patient needs post-hospital services based on physician/LIP order or complex needs related to functional status, cognitive ability or social support system  Outcome: Progressing     Problem: CARDIOVASCULAR - ADULT  Goal: Maintains optimal cardiac output and hemodynamic stability  Description: INTERVENTIONS:- Monitor vital signs, rhythm, and trends- Monitor for bleeding, hypotension and signs of decreased cardiac output- Evaluate  effectiveness of vasoactive medications to optimize hemodynamic stability- Monitor arterial and/or venous puncture sites for bleeding and/or hematoma- Assess quality of pulses, skin color and temperature- Assess for signs of decreased coronary artery perfusion - ex. Angina- Evaluate fluid balance, assess for edema, trend weights  Outcome: Progressing  Goal: Absence of cardiac arrhythmias or at baseline  Description: INTERVENTIONS:- Continuous cardiac monitoring, monitor vital signs, obtain 12 lead EKG if indicated- Evaluate effectiveness of antiarrhythmic and heart rate control medications as ordered- Initiate emergency measures for life threatening arrhythmias- Monitor electrolytes and administer replacement therapy as ordered  Outcome: Progressing     Problem: METABOLIC/FLUID AND ELECTROLYTES - ADULT  Goal: Glucose maintained within prescribed range  Description: INTERVENTIONS:- Monitor Blood Glucose as ordered- Assess for signs and symptoms of hyperglycemia and hypoglycemia- Administer ordered medications to maintain glucose within target range- Assess barriers to adequate nutritional intake and initiate nutrition consult as needed- Instruct patient on self management of diabetes  Outcome: Progressing  Goal: Electrolytes maintained within normal limits  Description: INTERVENTIONS:- Monitor labs and rhythm and assess patient for signs and symptoms of electrolyte imbalances- Administer electrolyte replacement as ordered- Monitor response to electrolyte replacements, including rhythm and repeat lab results as appropriate- Fluid restriction as ordered- Instruct patient on fluid and nutrition restrictions as appropriate  Outcome: Progressing       Problem: PAIN - ADULT  Goal: Verbalizes/displays adequate comfort level or patient's stated pain goal  Description: INTERVENTIONS:- Encourage pt to monitor pain and request assistance- Assess pain using appropriate pain scale- Administer analgesics based on type and severity  of pain and evaluate response- Implement non-pharmacological measures as appropriate and evaluate response- Consider cultural and social influences on pain and pain management- Manage/alleviate anxiety- Utilize distraction and/or relaxation techniques- Monitor for opioid side effects- Notify MD/LIP if interventions unsuccessful or patient reports new pain- Anticipate increased pain with activity and pre-medicate as appropriate  Outcome: Progressing

## 2025-05-11 LAB — APTT PPP: 25.8 SECONDS (ref 23–36)

## 2025-05-11 PROCEDURE — 99233 SBSQ HOSP IP/OBS HIGH 50: CPT | Performed by: HOSPITALIST

## 2025-05-11 PROCEDURE — 99232 SBSQ HOSP IP/OBS MODERATE 35: CPT | Performed by: INTERNAL MEDICINE

## 2025-05-11 RX ORDER — ALBUTEROL SULFATE 90 UG/1
2 INHALANT RESPIRATORY (INHALATION) EVERY 6 HOURS PRN
Status: DISCONTINUED | OUTPATIENT
Start: 2025-05-11 | End: 2025-05-12

## 2025-05-11 RX ORDER — PREDNISONE 20 MG/1
20 TABLET ORAL
Status: DISCONTINUED | OUTPATIENT
Start: 2025-05-12 | End: 2025-05-12

## 2025-05-11 RX ORDER — ALPRAZOLAM 0.5 MG
0.5 TABLET ORAL
Status: COMPLETED | OUTPATIENT
Start: 2025-05-12 | End: 2025-05-12

## 2025-05-11 NOTE — PROGRESS NOTES
[facility]  part of [facility]     Progress Note    Sophia Almanzar Patient Status:  Inpatient    10/25/1945 MRN [id number]   Location [facility] 3W/SW Attending Erin Barry MD   Hosp Day # 3 PCP SOPHIA DICKERSON MD       Subjective:   Patient seen examined.  Dyspnea slightly improved.  Wheezing overall improved    Objective:   Blood pressure 117/73, pulse 70, temperature 98.4 °F (36.9 °C), temperature source Oral, resp. rate 18, height 5' 1\" (1.549 m), weight 137 lb 6.4 oz (62.3 kg), SpO2 96%.  Intake/Output:   Last 3 shifts: I/O last 3 completed shifts:  In: 755.4 [P.O.:538; I.V.:217.4]  Out: 750 [Urine:750]   This shift: I/O this shift:  In: 360 [P.O.:360]  Out: 200 [Urine:200]     Vent Settings:      Hemodynamic parameters (last 24 hours):      Scheduled Meds: Current Hospital Medications[1]    Continuous Infusions: Medication Infusions[2]    Physical Exam  Constitutional: no acute distress  Eyes: PERRL  ENT: nares pateint  Neck: supple, no JVD  Cardio: RRR, S1 S2  Respiratory: Faint Rales  GI: abdomen soft, non tender, active bowel sounds, no organomegaly  Extremities: no clubbing, cyanosis, edema  Neurologic: no gross motor deficits  Skin: warm, dry      Results:     Lab Results   Component Value Date    PTT 25.8 2025       No results found.    EKG 12 Lead  Result Date: 5/10/2025  Normal sinus rhythm ST & T wave abnormality, consider anterolateral ischemia Prolonged QT interval or tu fusion, consider myocardial disease, electrolyte imbalance, or drug effects Abnormal ECG When compared with ECG of 08-MAY-2025 16:23, T wave inversion more evident in Inferior leads T wave inversion now evident in Anterior leads Confirmed by BUSHRA TORO JOHN ([id number]) on 5/10/2025 7:56:25 PM      Assessment   1.  Non-ST elevation myocardial infarction  2.  Asthma with mild exacerbation  3.  Hypertension     Plan   -Patient presents with evidence of dyspnea may be secondary to mild asthma  exacerbation.  Elevated troponin on presentation.  - Status post left heart cath with mild to moderate disease seen.  - Started on heparin gtt.  - 2Decho with ejection fraction 55 to 60% akinesis of apical wall seen.  Grade 3 diastolic dysfunction.  Cardiac MRI for early next week.  Further recommendations per cardiology  - ICS/LABA  - Gradually wean steroid therapy      Vega Stewart DO  Pulmonary Critical Care Medicine  [facility]          [1]   Current Facility-Administered Medications   Medication Dose Route Frequency    albuterol (Ventolin) (2.5 MG/3ML) 0.083% nebulizer solution 2.5 mg  2.5 mg Nebulization Q6H PRN    clopidogrel (Plavix) tab 75 mg  75 mg Oral Daily    butalbital-acetaminophen-caffeine (Fioricet) -40 MG per tab 1 tablet  1 tablet Oral Q4H PRN    magnesium oxide (Mag-Ox) tab 400 mg  400 mg Oral Daily    fluticasone-salmeterol (Advair Diskus) 500-50 MCG/ACT inhaler 1 puff  1 puff Inhalation BID    predniSONE (Deltasone) tab 40 mg  40 mg Oral Daily with breakfast    isosorbide mononitrate ER (Imdur) 24 hr tab 30 mg  30 mg Oral Daily    acetaminophen (Tylenol Extra Strength) tab 500 mg  500 mg Oral Q4H PRN    ondansetron (Zofran) 4 MG/2ML injection 4 mg  4 mg Intravenous Q6H PRN    metoclopramide (Reglan) 5 mg/mL injection 10 mg  10 mg Intravenous Q8H PRN    temazepam (Restoril) cap 15 mg  15 mg Oral Nightly PRN    atorvastatin (Lipitor) tab 40 mg  40 mg Oral Nightly    metoprolol tartrate (Lopressor) tab 25 mg  25 mg Oral 2x Daily(Beta Blocker)    escitalopram (Lexapro) tab 5 mg  5 mg Oral Daily    HYDROcodone-acetaminophen (Norco) 5-325 MG per tab 1 tablet  1 tablet Oral Q6H PRN    latanoprost (Xalatan) 0.005 % ophthalmic solution 1 drop  1 drop Both Eyes Nightly    rOPINIRole (Requip) tab 2 mg  2 mg Oral Nightly    ALPRAZolam (Xanax) tab 0.25 mg  0.25 mg Oral Nightly   [2]

## 2025-05-11 NOTE — PROGRESS NOTES
Progress Note  Sophia Almanzar Patient Status:  Inpatient    10/25/1945 MRN [id number]   Location [facility] 3W/SW Attending Erin Barry MD   Hosp Day # 3 PCP SOPHIA DICKERSON MD     Subjective:  Feels good today, just finished taking shower. Denies any chest pain, shortness of breath, palpitations or dizziness at this time.  Lower extremities edema improved with IV lasix.    Objective:  /73 (BP Location: Left arm)   Pulse 70   Temp 98.4 °F (36.9 °C) (Oral)   Resp 18   Ht 5' 1\" (1.549 m)   Wt 137 lb 6.4 oz (62.3 kg)   LMP  (LMP Unknown)   SpO2 96%   BMI 25.96 kg/m²     Telemetry: SR, HR 70s      Intake/Output:    Intake/Output Summary (Last 24 hours) at 2025 1223  Last data filed at 2025 0901  Gross per 24 hour   Intake 561.6 ml   Output 950 ml   Net -388.4 ml       Last 3 Weights   25 0522 137 lb 6.4 oz (62.3 kg)   05/10/25 0500 138 lb (62.6 kg)   25 2316 139 lb 15.9 oz (63.5 kg)   25 1609 139 lb 15.9 oz (63.5 kg)   25 1335 147 lb (66.7 kg)   10/08/24 1139 140 lb (63.5 kg)   24 1257 140 lb (63.5 kg)       Labs:  Recent Labs   Lab 25  1538 25  0641 05/10/25  0742   *  113* 124* 89   BUN 12  12 14 17   CREATSERUM 0.61  0.61 0.58 0.61   EGFRCR 91  91 93 91   CA 9.3  9.3 8.9 8.7     144 141 143   K 3.5  3.5 3.6 3.7     104 103 105   CO2 30.0  30.0 31.0 33.0*     Recent Labs   Lab 25  1423 25  1843 05/10/25  0742   RBC 4.45 4.18 3.93   HGB 13.6 12.6 12.0   HCT 42.4 39.1 37.4   MCV 95.3 93.5 95.2   MCH 30.6 30.1 30.5   MCHC 32.1 32.2 32.1   RDW 13.4 13.5 13.7   NEPRELIM 7.42 8.89*  --    WBC 10.3 9.9 7.7   .0 206.0 182.0         Recent Labs   Lab 25  1516 25  1538 25  1623   TROPHS 8,078* 8,888* 7,827*       Review of Systems   Constitutional: Negative.   Cardiovascular: Negative.    Respiratory: Negative.         Physical Exam:    Gen: alert, oriented x 3, NAD  Heent:  pupils equal, reactive. Mucous membranes moist.   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   On asa, statin, bb, heparin gtt  Echo 5/9/25 LVEF 55-60%, akinesis of the apical wall, grade 3 DD, severe low-flow low-gradient aortic stenosis, mod TR, PHTN    Severe aortic stenosis, low flow low gradient   HTN-controlled   HLP, on statin, LDL 66  Prediabetes, A1C 5.9%-per primary    Asthma    Plan:  Denies chest pain.  Continue asa,plavix, bb, statin.  Plan for cardiac MRI tomorrow to assess the viability of the inferior wall.  Severe low flow low gradient aortic stenosis-will need structural heart cardiology eval, likely as op.     Plan of care discussed with patient, RN.    Lita Paige, MATTHEW  5/11/2025  12:23 PM  [phone]      Above note reviewed, patient examined independently.    She is clinically and symptomatically stable.  We await results of MRI to guide interventional decisions.             [1]    [START ON 5/12/2025] predniSONE  20 mg Oral Daily with breakfast    clopidogrel  75 mg Oral Daily    magnesium oxide  400 mg Oral Daily    fluticasone-salmeterol  1 puff Inhalation BID    isosorbide mononitrate ER  30 mg Oral Daily    atorvastatin  40 mg Oral Nightly    metoprolol tartrate  25 mg Oral 2x Daily(Beta Blocker)    escitalopram  5 mg Oral Daily    latanoprost  1 drop Both Eyes Nightly    rOPINIRole HCl  2 mg Oral Nightly    ALPRAZolam  0.25 mg Oral Nightly   [2]

## 2025-05-11 NOTE — PROGRESS NOTES
[facility]  part of Located within [facility]    Progress Note    Sophia Almanzar Patient Status:  Inpatient    10/25/1945 MRN [id number]   Location [facility] 3W/SW Attending Eirn Barry MD   Hosp Day # 2 PCP SOPHIA DICKERSON MD     Chief complaint: wheezing  Subjective:     Pt reports feeling better overall, however reports intermittent wheezing  Seems correlates with her anxiety  More sob while wheezing, now on suppl O2  C/o headache    No clear CP  No fevers  Very anxious    Family at bedside      Objective:   Blood pressure 92/70, pulse 84, temperature 98.4 °F (36.9 °C), temperature source Oral, resp. rate 22, height 5' 1\" (1.549 m), weight 138 lb (62.6 kg), SpO2 94%.    HEENT: conjunctivae/corneas clear. PERRL, EOM's intact.  Neck: no adenopathy, no carotid bruit, no JVD, supple  Pulmonary:  clear to auscultation bilaterally, no wheezing curently  Cardiovascular: S1, S2 normal, no murmur, click, rub or gallop, regular rate and rhythm  Abdominal: soft, non-tender; bowel sounds normal; no masses,  no organomegaly  Extremities: no cyanosis or edema  Pulses: palpable and symmetric  Skin: No rashes or lesions  Neurologic: Alert and oriented X 3, normal strength, coordination and gait  Psychiatric: very anxious, cooperative    Assessment and Plan:     Non-ST-elevation myocardial infarction.  -initial trops >8,000  -likely late presentation  -cardiology consulted, seen by Dr. Palumbo, then  underwent emergent LHC with Dr. Easton  showing: Mild to moderate disease of the left system. 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 Normal LVEDP. Mild hypokinesis of inferior wall.   -->recommend: Viability for the inferior wall, based on LV angiogram it appears that the inferior wall is viable. Would need retrograde intervention of the right coronary artery due to flush occlusion at the ostium.   -MRI pending  -ECHO pending  -tele  -cont BB for now--> pt  reports intermittent wheezing that she attributes to metoprolol. She takes metoprolol at home bid with occasional wheeze that is going away within hr or so.  -seems like her wheezing correlates actually with her anxiety   -heparin ggt  -s/p  mg in ER (in the past no issues with ASA 81 mg, but stopped taking >10 yrs ago)  -now on plavix ordered per cardiology  -NTG prn  -reported allergy to NSAIDs (naproxen), also to ACEI (lisinopril), amlodipine, diltiazem    Headache  Seems tension type, no neuro deficits, might be related to NTG as well  -supportive    Mild asthma exacerbation.  -restart inhalers    Essential hypertension.   -BP ok, monitor    Severe anxiety disorder.  RLS  -cont meds, supportive    A1c 5.9    Abn LFT, mild  -repeat better    HA-->supportive       Dispo: home once stable, anticipate Monday or Tuesday, pt lives alone         Supplementary Documentation:   DVT Mechanical Prophylaxis:        DVT Pharmacologic Prophylaxis   Medication   None                Code Status: Not on file  Pringle: No urinary catheter in place  Pringle Duration (in days):   Central line:    MARISOL: 5/11/2025        **Certification      PHYSICIAN Certification of Need for Inpatient Hospitalization - Initial Certification    Patient will require inpatient services that will reasonably be expected to span two midnight's based on the clinical documentation in H+P.   Based on patients current state of illness, I anticipate that, after discharge, patient will require TBD.                Chart reviewed, including current vitals, notes, labs and imaging  Pertinent past medical records reviewed  Labs ordered and medications adjusted as outlined above  Home medications reconciliation completed  Coordinate care with care team/consultants  Discussed with patient  at bedside results of tests, management plan as outlined above, and the need for ongoing hospitalization  D/w RN     MDM high  severe acute illness/or exacerbation of chronic  illness posing a threat to life, IV medications, requiring close monitoring in hospital.       Results:     Lab Results   Component Value Date    WBC 7.7 05/10/2025    HGB 12.0 05/10/2025    HCT 37.4 05/10/2025    .0 05/10/2025    CREATSERUM 0.61 05/10/2025    BUN 17 05/10/2025     05/10/2025    K 3.7 05/10/2025     05/10/2025    CO2 33.0 (H) 05/10/2025    GLU 89 05/10/2025    CA 8.7 05/10/2025    ALB 4.1 05/10/2025    ALKPHO 68 05/10/2025    BILT 0.5 05/10/2025    TP 6.1 05/10/2025    AST 42 (H) 05/10/2025    ALT 50 (H) 05/10/2025    PTT 50.8 (H) 05/10/2025    DDIMER 0.62 05/08/2025    MG 2.0 05/10/2025       No results found.  EKG 12 Lead  Result Date: 5/10/2025  Normal sinus rhythm ST & T wave abnormality, consider anterolateral ischemia Prolonged QT interval or tu fusion, consider myocardial disease, electrolyte imbalance, or drug effects Abnormal ECG When compared with ECG of 08-MAY-2025 16:23, T wave inversion more evident in Inferior leads T wave inversion now evident in Anterior leads Confirmed by BUSHRA TORO, KAYLA ([id number]) on 5/10/2025 7:56:25 PM        NORMA MUELLER MD  5/10/2025

## 2025-05-11 NOTE — PROGRESS NOTES
[facility]  part of [facility]    Progress Note    Sophia Almanzar Patient Status:  Inpatient    10/25/1945 MRN [id number]   Location [facility] 3W/SW Attending Erin Barry MD   Hosp Day # 3 PCP SOPHIA DICKERSON MD     Chief complaint: wheezing  Subjective:     Pt reports feeling better overall, however reports intermittent wheezing  C/o headache    No clear CP  No fevers  Very anxious           Objective:   Blood pressure 121/84, pulse 84, temperature 98.4 °F (36.9 °C), temperature source Oral, resp. rate 18, height 5' 1\" (1.549 m), weight 137 lb 6.4 oz (62.3 kg), SpO2 92%.    HEENT: conjunctivae/corneas clear. PERRL, EOM's intact.  Neck: no adenopathy, no carotid bruit, no JVD, supple  Pulmonary:  clear to auscultation bilaterally, no wheezing curently  Cardiovascular: S1, S2 normal, no murmur, click, rub or gallop, regular rate and rhythm  Abdominal: soft, non-tender; bowel sounds normal; no masses,  no organomegaly  Extremities: no cyanosis or edema  Pulses: palpable and symmetric  Skin: No rashes or lesions  Neurologic: Alert and oriented X 3, normal strength, coordination and gait  Psychiatric: very anxious, cooperative    Assessment and Plan:     Non-ST-elevation myocardial infarction.  -initial trops >8,000  -likely late presentation  -cardiology consulted, seen by Dr. Palumbo, then  underwent emergent LHC with Dr. Easton  showing: Mild to moderate disease of the left system. 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 Normal LVEDP. Mild hypokinesis of inferior wall.   -->recommend: Viability for the inferior wall, based on LV angiogram it appears that the inferior wall is viable. Would need retrograde intervention of the right coronary artery due to flush occlusion at the ostium.   -MRI pending  -ECHO abn-->EF 55-60%, mod concentric hypertrophy, a reversible restrictive pattern, indicative of decreased left ventricular  diastolic compliance and/or    increased left atrial pressure - grade 3 diastolic dysfunction. RVSP is elevated, 50-55mmHg, suggestive of at least moderate pulmonary hypertension, severe low-flow low-gradient aortic stenosis.        -tele  -cont BB for now--> pt reports intermittent wheezing that she attributes to metoprolol. She takes metoprolol at home bid with occasional wheeze that is going away within hr or so.  -seems like her wheezing correlates actually with her anxiety   -heparin ggt  -s/p  mg in ER (in the past no issues with ASA 81 mg, but stopped taking >10 yrs ago)  -now on plavix ordered per cardiology  -NTG prn  -reported allergy to NSAIDs (naproxen), also to ACEI (lisinopril), amlodipine, diltiazem    Headache  Seems tension type, no neuro deficits, might be related to NTG as well  -supportive    Mild asthma exacerbation.  -restart inhalers    Essential hypertension.   -BP ok, monitor    Severe anxiety disorder.  RLS  -cont meds, supportive    A1c 5.9    Abn LFT, mild  -repeat better    HA-->supportive       Dispo: home once stable, anticipate Monday or Tuesday, pt lives alone         Supplementary Documentation:   DVT Mechanical Prophylaxis:        DVT Pharmacologic Prophylaxis   Medication   None                Code Status: Not on file  Pringle: No urinary catheter in place  Pringle Duration (in days):   Central line:    MARISOL: 5/12/2025        **Certification      PHYSICIAN Certification of Need for Inpatient Hospitalization - Initial Certification    Patient will require inpatient services that will reasonably be expected to span two midnight's based on the clinical documentation in H+P.   Based on patients current state of illness, I anticipate that, after discharge, patient will require TBD.                Chart reviewed, including current vitals, notes, labs and imaging  Pertinent past medical records reviewed  Labs ordered and medications adjusted as outlined above  Home medications  reconciliation completed  Coordinate care with care team/consultants  Discussed with patient  at bedside results of tests, management plan as outlined above, and the need for ongoing hospitalization  D/w RN     MDM high  severe acute illness/or exacerbation of chronic illness posing a threat to life, IV medications, requiring close monitoring in hospital.       Results:     Lab Results   Component Value Date    WBC 7.7 05/10/2025    HGB 12.0 05/10/2025    HCT 37.4 05/10/2025    .0 05/10/2025    CREATSERUM 0.61 05/10/2025    BUN 17 05/10/2025     05/10/2025    K 3.7 05/10/2025     05/10/2025    CO2 33.0 (H) 05/10/2025    GLU 89 05/10/2025    CA 8.7 05/10/2025    ALB 4.1 05/10/2025    ALKPHO 68 05/10/2025    BILT 0.5 05/10/2025    TP 6.1 05/10/2025    AST 42 (H) 05/10/2025    ALT 50 (H) 05/10/2025    PTT 25.8 05/11/2025    DDIMER 0.62 05/08/2025    MG 2.0 05/10/2025       No results found.  EKG 12 Lead  Result Date: 5/10/2025  Normal sinus rhythm ST & T wave abnormality, consider anterolateral ischemia Prolonged QT interval or tu fusion, consider myocardial disease, electrolyte imbalance, or drug effects Abnormal ECG When compared with ECG of 08-MAY-2025 16:23, T wave inversion more evident in Inferior leads T wave inversion now evident in Anterior leads Confirmed by BUSHRA TORO, KAYLA ([id number]) on 5/10/2025 7:56:25 PM        NORMA MUELLER MD  5/11/2025

## 2025-05-11 NOTE — PLAN OF CARE
Patient short of breath and wheezing at 5-6am, prn nebs given with relief. Vital signs stable, call light within reach bed locked in lowest position.    Problem: SAFETY ADULT - FALL  Goal: Free from fall injury  Description: INTERVENTIONS:- Assess pt frequently for physical needs- Identify cognitive and physical deficits and behaviors that affect risk of falls.- [place] fall precautions as indicated by assessment.- Educate pt/family on patient safety including physical limitations- Instruct pt to call for assistance with activity based on assessment- Modify environment to reduce risk of injury- Provide assistive devices as appropriate- Consider OT/PT consult to assist with strengthening/mobility- Encourage toileting schedule  Outcome: Progressing     Problem: DISCHARGE PLANNING  Goal: Discharge to home or other facility with appropriate resources  Description: INTERVENTIONS:- Identify barriers to discharge w/pt and caregiver- Include patient/family/discharge partner in discharge planning- Arrange for needed discharge resources and transportation as appropriate- Identify discharge learning needs (meds, wound care, etc)- Arrange for interpreters to assist at discharge as needed- Consider post-discharge preferences of patient/family/discharge partner- Complete POLST form as appropriate- Assess patient's ability to be responsible for managing their own health- Refer to Case Management Department for coordinating discharge planning if the patient needs post-hospital services based on physician/LIP order or complex needs related to functional status, cognitive ability or social support system  Outcome: Progressing     Problem: CARDIOVASCULAR - ADULT  Goal: Maintains optimal cardiac output and hemodynamic stability  Description: INTERVENTIONS:- Monitor vital signs, rhythm, and trends- Monitor for bleeding, hypotension and signs of decreased cardiac output- Evaluate effectiveness of vasoactive medications to optimize  hemodynamic stability- Monitor arterial and/or venous puncture sites for bleeding and/or hematoma- Assess quality of pulses, skin color and temperature- Assess for signs of decreased coronary artery perfusion - ex. Angina- Evaluate fluid balance, assess for edema, trend weights  Outcome: Progressing  Goal: Absence of cardiac arrhythmias or at baseline  Description: INTERVENTIONS:- Continuous cardiac monitoring, monitor vital signs, obtain 12 lead EKG if indicated- Evaluate effectiveness of antiarrhythmic and heart rate control medications as ordered- Initiate emergency measures for life threatening arrhythmias- Monitor electrolytes and administer replacement therapy as ordered  Outcome: Progressing     Problem: METABOLIC/FLUID AND ELECTROLYTES - ADULT  Goal: Glucose maintained within prescribed range  Description: INTERVENTIONS:- Monitor Blood Glucose as ordered- Assess for signs and symptoms of hyperglycemia and hypoglycemia- Administer ordered medications to maintain glucose within target range- Assess barriers to adequate nutritional intake and initiate nutrition consult as needed- Instruct patient on self management of diabetes  Outcome: Progressing  Goal: Electrolytes maintained within normal limits  Description: INTERVENTIONS:- Monitor labs and rhythm and assess patient for signs and symptoms of electrolyte imbalances- Administer electrolyte replacement as ordered- Monitor response to electrolyte replacements, including rhythm and repeat lab results as appropriate- Fluid restriction as ordered- Instruct patient on fluid and nutrition restrictions as appropriate  Outcome: Progressing     Problem: Patient Centered Care  Goal: Patient preferences are identified and integrated in the patient's plan of care  Description: Interventions:- What would you like us to know as we care for you? - Provide timely, complete, and accurate information to patient/family- Incorporate patient and family knowledge, values, beliefs,  and cultural backgrounds into the planning and delivery of care- Encourage patient/family to participate in care and decision-making at the level they choose- Honor patient and family perspectives and choices  Outcome: Progressing     Problem: Patient/Family Goals  Goal: Patient/Family Long Term Goal  Description: Patient's Long Term Goal: Interventions:-See additional Care Plan goals for specific interventions  Outcome: Progressing  Goal: Patient/Family Short Term Goal  Description: Patient's Short Term Goal: Interventions: -  See additional Care Plan goals for specific interventions  Outcome: Progressing     Problem: PAIN - ADULT  Goal: Verbalizes/displays adequate comfort level or patient's stated pain goal  Description: INTERVENTIONS:- Encourage pt to monitor pain and request assistance- Assess pain using appropriate pain scale- Administer analgesics based on type and severity of pain and evaluate response- Implement non-pharmacological measures as appropriate and evaluate response- Consider cultural and social influences on pain and pain management- Manage/alleviate anxiety- Utilize distraction and/or relaxation techniques- Monitor for opioid side effects- Notify MD/LIP if interventions unsuccessful or patient reports new pain- Anticipate increased pain with activity and pre-medicate as appropriate  Outcome: Progressing

## 2025-05-11 NOTE — PLAN OF CARE
Pt alert and oriented x4. Independent. PO prednisone. Pt updated on plan of care. Plan for cardiac MRI. Safety precautions in place. Call light within reach.    Problem: SAFETY ADULT - FALL  Goal: Free from fall injury  Description: INTERVENTIONS:- Assess pt frequently for physical needs- Identify cognitive and physical deficits and behaviors that affect risk of falls.- [place] fall precautions as indicated by assessment.- Educate pt/family on patient safety including physical limitations- Instruct pt to call for assistance with activity based on assessment- Modify environment to reduce risk of injury- Provide assistive devices as appropriate- Consider OT/PT consult to assist with strengthening/mobility- Encourage toileting schedule  5/11/2025 1322 by Paige Lopez, RN  Outcome: Progressing  5/11/2025 1321 by Paige Lopez RN  Outcome: Progressing     Problem: DISCHARGE PLANNING  Goal: Discharge to home or other facility with appropriate resources  Description: INTERVENTIONS:- Identify barriers to discharge w/pt and caregiver- Include patient/family/discharge partner in discharge planning- Arrange for needed discharge resources and transportation as appropriate- Identify discharge learning needs (meds, wound care, etc)- Arrange for interpreters to assist at discharge as needed- Consider post-discharge preferences of patient/family/discharge partner- Complete POLST form as appropriate- Assess patient's ability to be responsible for managing their own health- Refer to Case Management Department for coordinating discharge planning if the patient needs post-hospital services based on physician/LIP order or complex needs related to functional status, cognitive ability or social support system  5/11/2025 1322 by Paige Lopez, RN  Outcome: Progressing  5/11/2025 1321 by Paige Lopez, RN  Outcome: Progressing     Problem: CARDIOVASCULAR - ADULT  Goal: Maintains optimal cardiac output and hemodynamic  stability  Description: INTERVENTIONS:- Monitor vital signs, rhythm, and trends- Monitor for bleeding, hypotension and signs of decreased cardiac output- Evaluate effectiveness of vasoactive medications to optimize hemodynamic stability- Monitor arterial and/or venous puncture sites for bleeding and/or hematoma- Assess quality of pulses, skin color and temperature- Assess for signs of decreased coronary artery perfusion - ex. Angina- Evaluate fluid balance, assess for edema, trend weights  5/11/2025 1322 by Paige Lopez RN  Outcome: Progressing  5/11/2025 1321 by Paige Lopez RN  Outcome: Progressing  Goal: Absence of cardiac arrhythmias or at baseline  Description: INTERVENTIONS:- Continuous cardiac monitoring, monitor vital signs, obtain 12 lead EKG if indicated- Evaluate effectiveness of antiarrhythmic and heart rate control medications as ordered- Initiate emergency measures for life threatening arrhythmias- Monitor electrolytes and administer replacement therapy as ordered  5/11/2025 1322 by Paige Lopez RN  Outcome: Progressing  5/11/2025 1321 by Paige Lopez RN  Outcome: Progressing     Problem: METABOLIC/FLUID AND ELECTROLYTES - ADULT  Goal: Glucose maintained within prescribed range  Description: INTERVENTIONS:- Monitor Blood Glucose as ordered- Assess for signs and symptoms of hyperglycemia and hypoglycemia- Administer ordered medications to maintain glucose within target range- Assess barriers to adequate nutritional intake and initiate nutrition consult as needed- Instruct patient on self management of diabetes  INTERVENTIONS:- Monitor Blood Glucose as ordered- Assess for signs and symptoms of hyperglycemia and hypoglycemia- Administer ordered medications to maintain glucose within target range- Assess barriers to adequate nutritional intake and initiate nutrition consult as needed- Instruct patient on self management of diabetes  5/11/2025 1322 by Paige Lopez RN  Outcome:  Progressing  5/11/2025 1321 by Paige Lopez RN  Outcome: Progressing  Goal: Electrolytes maintained within normal limits  Description: INTERVENTIONS:- Monitor labs and rhythm and assess patient for signs and symptoms of electrolyte imbalances- Administer electrolyte replacement as ordered- Monitor response to electrolyte replacements, including rhythm and repeat lab results as appropriate- Fluid restriction as ordered- Instruct patient on fluid and nutrition restrictions as appropriate  5/11/2025 1322 by Paige Lopez RN  Outcome: Progressing  5/11/2025 1321 by Paige Lopez RN  Outcome: Progressing     Problem: Patient Centered Care  Goal: Patient preferences are identified and integrated in the patient's plan of care  Description: Interventions:- What would you like us to know as we care for you? - Provide timely, complete, and accurate information to patient/family- Incorporate patient and family knowledge, values, beliefs, and cultural backgrounds into the planning and delivery of care- Encourage patient/family to participate in care and decision-making at the level they choose- Honor patient and family perspectives and choices  5/11/2025 1322 by Paige Lopez RN  Outcome: Progressing  5/11/2025 1321 by Paige Lopez RN  Outcome: Progressing     Problem: Patient/Family Goals  Goal: Patient/Family Long Term Goal  Description: Patient's Long Term Goal: discharge  Interventions:  - Monitor vital signs  - Post cath assessments  - Cardiac MRI  - See additional Care Plan goals for specific interventions  5/11/2025 1322 by Paige Lopez RN  Outcome: Progressing  5/11/2025 1321 by Paige Lopez RN  Outcome: Progressing  Goal: Patient/Family Short Term Goal  Description: Patient's Short Term Goal: feel better  Interventions:  - Ambulate as tolerated  - Continue ADLs  - See additional Care Plan goals for specific interventions  5/11/2025 1322 by Paige Lopez RN  Outcome: Progressing  5/11/2025 1321 by John  LEE Gibson  Outcome: Progressing     Problem: PAIN - ADULT  Goal: Verbalizes/displays adequate comfort level or patient's stated pain goal  Description: INTERVENTIONS:- Encourage pt to monitor pain and request assistance- Assess pain using appropriate pain scale- Administer analgesics based on type and severity of pain and evaluate response- Implement non-pharmacological measures as appropriate and evaluate response- Consider cultural and social influences on pain and pain management- Manage/alleviate anxiety- Utilize distraction and/or relaxation techniques- Monitor for opioid side effects- Notify MD/LIP if interventions unsuccessful or patient reports new pain- Anticipate increased pain with activity and pre-medicate as appropriate  5/11/2025 1322 by Paige Lopez RN  Outcome: Progressing  5/11/2025 1321 by Paige Lopez RN  Outcome: Progressing     Problem: Diabetes/Glucose Control  Goal: Glucose maintained within prescribed range  Description: INTERVENTIONS:- Monitor Blood Glucose as ordered- Assess for signs and symptoms of hyperglycemia and hypoglycemia- Administer ordered medications to maintain glucose within target range- Assess barriers to adequate nutritional intake and initiate nutrition consult as needed- Instruct patient on self management of diabetes  INTERVENTIONS:- Monitor Blood Glucose as ordered- Assess for signs and symptoms of hyperglycemia and hypoglycemia- Administer ordered medications to maintain glucose within target range- Assess barriers to adequate nutritional intake and initiate nutrition consult as needed- Instruct patient on self management of diabetes  5/11/2025 1322 by Paige Lopez RN  Outcome: Progressing  5/11/2025 1321 by Paige Lopez, RN  Outcome: Progressing

## 2025-05-12 ENCOUNTER — APPOINTMENT (OUTPATIENT)
Dept: MRI IMAGING | Facility: HOSPITAL | Age: 80
End: 2025-05-12
Payer: MEDICARE

## 2025-05-12 VITALS
RESPIRATION RATE: 18 BRPM | OXYGEN SATURATION: 93 % | TEMPERATURE: 99 F | HEIGHT: 61 IN | HEART RATE: 74 BPM | WEIGHT: 138.88 LBS | SYSTOLIC BLOOD PRESSURE: 127 MMHG | DIASTOLIC BLOOD PRESSURE: 64 MMHG | BODY MASS INDEX: 26.22 KG/M2

## 2025-05-12 LAB
ANION GAP SERPL CALC-SCNC: 6 MMOL/L (ref 0–18)
BUN BLD-MCNC: 19 MG/DL (ref 9–23)
BUN/CREAT SERPL: 27.5 (ref 10–20)
CALCIUM BLD-MCNC: 9.4 MG/DL (ref 8.7–10.4)
CHLORIDE SERPL-SCNC: 103 MMOL/L (ref 98–112)
CO2 SERPL-SCNC: 32 MMOL/L (ref 21–32)
CREAT BLD-MCNC: 0.69 MG/DL (ref 0.55–1.02)
EGFRCR SERPLBLD CKD-EPI 2021: 89 ML/MIN/1.73M2 (ref 60–?)
GLUCOSE BLD-MCNC: 101 MG/DL (ref 70–99)
MAGNESIUM SERPL-MCNC: 2.1 MG/DL (ref 1.6–2.6)
OSMOLALITY SERPL CALC.SUM OF ELEC: 294 MOSM/KG (ref 275–295)
POTASSIUM SERPL-SCNC: 4.1 MMOL/L (ref 3.5–5.1)
SODIUM SERPL-SCNC: 141 MMOL/L (ref 136–145)

## 2025-05-12 PROCEDURE — 99239 HOSP IP/OBS DSCHRG MGMT >30: CPT | Performed by: HOSPITALIST

## 2025-05-12 PROCEDURE — 99232 SBSQ HOSP IP/OBS MODERATE 35: CPT | Performed by: INTERNAL MEDICINE

## 2025-05-12 PROCEDURE — 75561 CARDIAC MRI FOR MORPH W/DYE: CPT

## 2025-05-12 RX ORDER — LEVALBUTEROL TARTRATE 45 UG/1
2 AEROSOL, METERED ORAL EVERY 6 HOURS PRN
Status: SHIPPED | COMMUNITY
Start: 2025-05-12

## 2025-05-12 RX ORDER — FUROSEMIDE 20 MG/1
20 TABLET ORAL DAILY
Qty: 30 TABLET | Refills: 0 | Status: SHIPPED | OUTPATIENT
Start: 2025-05-13

## 2025-05-12 RX ORDER — FUROSEMIDE 20 MG/1
20 TABLET ORAL DAILY
Status: DISCONTINUED | OUTPATIENT
Start: 2025-05-12 | End: 2025-05-12

## 2025-05-12 RX ORDER — ASPIRIN 81 MG/1
81 TABLET ORAL DAILY
Qty: 30 TABLET | Refills: 0 | Status: SHIPPED | OUTPATIENT
Start: 2025-05-13

## 2025-05-12 RX ORDER — ASPIRIN 81 MG/1
81 TABLET ORAL DAILY
Status: DISCONTINUED | OUTPATIENT
Start: 2025-05-12 | End: 2025-05-12

## 2025-05-12 RX ORDER — CLOPIDOGREL BISULFATE 75 MG/1
75 TABLET ORAL DAILY
Qty: 30 TABLET | Refills: 0 | Status: SHIPPED | OUTPATIENT
Start: 2025-05-13

## 2025-05-12 RX ORDER — ALPRAZOLAM 0.25 MG
0.25 TABLET ORAL NIGHTLY PRN
Status: SHIPPED | COMMUNITY
Start: 2025-05-12

## 2025-05-12 RX ORDER — ISOSORBIDE MONONITRATE 30 MG/1
30 TABLET, EXTENDED RELEASE ORAL DAILY
Qty: 30 TABLET | Refills: 0 | Status: SHIPPED | OUTPATIENT
Start: 2025-05-13

## 2025-05-12 RX ORDER — PREDNISONE 20 MG/1
20 TABLET ORAL
Qty: 4 TABLET | Refills: 0 | Status: SHIPPED | OUTPATIENT
Start: 2025-05-13

## 2025-05-12 RX ORDER — GADOTERATE MEGLUMINE 376.9 MG/ML
20 INJECTION INTRAVENOUS
Status: COMPLETED | OUTPATIENT
Start: 2025-05-12 | End: 2025-05-12

## 2025-05-12 NOTE — PROGRESS NOTES
[facility]  part of [facility]    Progress Note    Sophia Almanzar Patient Status:  Inpatient    10/25/1945 MRN [id number]   Location [facility] 3W/SW Attending Erin Barry MD   Hosp Day # 4 PCP SOPHIA DICKERSON MD       Subjective:     Constitutional:  Negative for fever.   Respiratory:  Negative for cough and shortness of breath.    Cardiovascular:  Negative for chest pain.   Gastrointestinal:  Negative for abdominal distention.   Psychiatric/Behavioral: Negative.         Objective:   Blood pressure 141/87, pulse 76, temperature 98.3 °F (36.8 °C), temperature source Oral, resp. rate 18, height 5' 1\" (1.549 m), weight 138 lb 14.4 oz (63 kg), SpO2 94%.  Physical Exam  Constitutional:       General: She is not in acute distress.     Appearance: Normal appearance.   HENT:      Head: Atraumatic.      Nose: Nose normal.      Mouth/Throat:      Mouth: Mucous membranes are moist.   Eyes:      General: No scleral icterus.  Cardiovascular:      Rate and Rhythm: Normal rate.      Heart sounds:      No gallop.   Pulmonary:      Effort: No respiratory distress.      Breath sounds: No stridor. No wheezing, rhonchi or rales.   Chest:      Chest wall: No tenderness.   Abdominal:      Palpations: Abdomen is soft.   Musculoskeletal:      Cervical back: Normal range of motion.      Right lower leg: No edema.      Left lower leg: No edema.   Skin:     General: Skin is dry.   Neurological:      Mental Status: She is oriented to person, place, and time.         Results:   Lab Results   Component Value Date    WBC 7.7 05/10/2025    HGB 12.0 05/10/2025    HCT 37.4 05/10/2025    .0 05/10/2025    CREATSERUM 0.61 05/10/2025    BUN 17 05/10/2025     05/10/2025    K 3.7 05/10/2025     05/10/2025    CO2 33.0 (H) 05/10/2025    GLU 89 05/10/2025    CA 8.7 05/10/2025    ALB 4.1 05/10/2025    ALKPHO 68 05/10/2025    BILT 0.5 05/10/2025    TP 6.1 05/10/2025    AST 42 (H) 05/10/2025    ALT  50 (H) 05/10/2025    PTT 25.8 05/11/2025    DDIMER 0.62 05/08/2025    MG 2.0 05/10/2025    TROPHS 7,827 (HH) 05/08/2025           Assessment & Plan:         1- NSTEMI   Echo with LVEF 55 to 60%  Per cardiology    2-asthma with mild acute exacerbation  Better now on taper steroid  Advair inhaler  Albuterol as needed  On room air and pulmonary status is stable  Follow-up in pulmonary clinic with Dr. Stewart      3-HTN, anxiety, RLS     4-DVT prophylaxis  Recommend heparin subcu                    Guille Awad MD  5/12/2025

## 2025-05-12 NOTE — PROGRESS NOTES
[facility] Cardiology Progress Note    Sophia Almanzar Patient Status:  Inpatient    10/25/1945 MRN [id number]   Location [facility] 3W/SW Attending Erin Barry MD   Hosp Day # 4 PCP SOPHIA DICKERSON MD     Subjective:  No CV concerns. Has been ambulating with no sx     Objective:  /61 (BP Location: Left arm)   Pulse 76   Temp 98.1 °F (36.7 °C) (Oral)   Resp 18   Ht 154.9 cm (5' 1\")   Wt 138 lb 14.4 oz (63 kg)   LMP  (LMP Unknown)   SpO2 94%   BMI 26.24 kg/m²     Telemetry: NSR w/ occ pacs, 76 bpm       Intake/Output:    Intake/Output Summary (Last 24 hours) at 2025 1038  Last data filed at 2025 0900  Gross per 24 hour   Intake 1297 ml   Output 950 ml   Net 347 ml       Last 3 Weights   25 0527 138 lb 14.4 oz (63 kg)   25 0522 137 lb 6.4 oz (62.3 kg)   05/10/25 0500 138 lb (62.6 kg)   25 2316 139 lb 15.9 oz (63.5 kg)   25 1609 139 lb 15.9 oz (63.5 kg)   25 1335 147 lb (66.7 kg)   10/08/24 1139 140 lb (63.5 kg)   24 1257 140 lb (63.5 kg)       Labs:  Recent Labs   Lab 25  1538 25  0641 05/10/25  0742   *  113* 124* 89   BUN 12  12 14 17   CREATSERUM 0.61  0.61 0.58 0.61   EGFRCR 91  91 93 91   CA 9.3  9.3 8.9 8.7     144 141 143   K 3.5  3.5 3.6 3.7     104 103 105   CO2 30.0  30.0 31.0 33.0*     Recent Labs   Lab 25  1423 25  1843 05/10/25  0742   RBC 4.45 4.18 3.93   HGB 13.6 12.6 12.0   HCT 42.4 39.1 37.4   MCV 95.3 93.5 95.2   MCH 30.6 30.1 30.5   MCHC 32.1 32.2 32.1   RDW 13.4 13.5 13.7   NEPRELIM 7.42 8.89*  --    WBC 10.3 9.9 7.7   .0 206.0 182.0         Recent Labs   Lab 25  1516 25  1538 25  1623   TROPHS 8,078* 8,888* 7,827*       Diagnostics:     25 [facility]  Cardiologist: Seaed Easton MD  Primary Proceduralist: Saeed Easton MD  Procedure Performed: [facility], COR, and LV  Date of Procedure: 2025      Pre procedure diagnosis: Non-ST elevation  MI  Post procedure diagnosis: As above     Summary of findings: Mild to moderate disease of the left system.  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  Normal LVEDP.  Mild hypokinesis of inferior wall.        Post procedure findings:  1) Left Ventriculography at 30 degrees WONG: LVEF: 50% with mild hypokinesis of basal inferolateral segment.  2) Hemodynamics: LVEDP: 12 mmHg, mild gradient across aortic valve.  3) Coronaries:  Left main is patent, heavily calcified and has 20 to 30% stenosis in the ostium and free of obstructive disease  LAD is heavily calcified, patent and free of obstructive disease, supplies 2 diagonals which are non-obstructive  Cx is patent heavily calcified and free of obstructive disease, supplies 2 OM branches which are patent  RCA is occluded at the ostium.  No antegrade flow seen.  Right coronary is visualized with left-to-right collaterals filling all the way to the ostium.        Recommendations:  Viability for the inferior wall, based on LV angiogram it appears that the inferior wall is viable.  Would need retrograde intervention of the right coronary artery due to flush occlusion at the ostium.     Summary of Case: After written informed consent was obtained from the patient, patient was brought to the cardiac catheterization laboratory.  Patient was prepped and draped in the usual sterile fashion. Lidocaine 1% was used to infiltrate the right groin for local anesthesia and a 6 [place]i introducer sheath was inserted into the right femoral artery.       Selective coronary angiography performed with JR4 catheter for RCA and JL4 catheter for LCA.  Angiography performed in standard projections.             Specimen sent to: No specimen collected  Estimated blood loss: 10 cc  Closure: Perclose    5/9/25 Echo  1. Left ventricle: The cavity size was normal. There was moderate concentric      hypertrophy. Systolic function was normal. The estimated  ejection      fraction was 55-60%. There is akinesis of the apical wall. Doppler      parameters are consistent with a reversible restrictive pattern,      indicative of decreased left ventricular diastolic compliance and/or      increased left atrial pressure - grade 3 diastolic dysfunction.   2. Right ventricle: The cavity size was normal. Systolic function was      normal. RVSP is elevated, 50-55mmHg, suggestive of at least moderate      pulmonary hypertension.   3. Aortic valve: The Vmax is 3m/s, MG is 18mmHg and the MARIANO per continuity      equation is 0.8cm2 with a reduced indexed SV of 32cc/m2 suggestive of      severe low-flow low-gradient aortic stenosis.   4. Inferior vena cava: The IVC was normal-sized. Respirophasic diameter      changes are blunted (< 50%), consistent with elevated central venous      pressure.   Impressions:  There is severe low-flow low-gradient aortic stenosis (Vmax   3m/s, MG 18mmHg and MARIANO per continuity equation of 0.8cm2 with reduced   indexed SV of 32cc/m2. Recommend further assessment with MDCT given   paradoxical LF-LG AS.   *     Review of Systems   Respiratory:  Negative for shortness of breath and wheezing.    Cardiovascular:  Positive for leg swelling. Negative for chest pain, palpitations and orthopnea.       Physical Exam:    General: Alert and oriented x 3. No apparent distress.   HEENT: Normocephalic, anicteric sclera, neck supple, no thyromegaly or adenopathy.  Neck: No JVD, carotids 2+, no bruits.  Cardiac: Regular rate & rhythm. S1, S2 normal. No  pericardial rub, S3, or extra cardiac sounds.+2/6 SHANICE  Lungs: Clear without wheezes, rales, rhonchi or dullness.  Normal excursions and effort.  Abdomen: Soft, non-tender. No organosplenomegally, mass or rebound, BS-present.  Extremities: Without clubbing or cyanosis.  +Trace BLE edema R > L   Neurologic: Alert and oriented, normal affect. No focal defects  Skin: Warm and dry.       Medications:  Scheduled  Medications[1]  Medication Infusions[2]    Assessment:    NSTEMI s/p LHC 5/8/25 shoed mild-mod left system disease with flush occlusion of the RCA with established collaterals, will need retrograde intervention of the RCA d/t flush occlusion of the ostium.   On plavix, atorvastatin, lopressor   Echo 5/9/25 LVEF 55-60%, akinesis of the apical wall, grade 3 DD, severe low-flow low-gradient aortic stenosis, mod TR, PHTN    Pending cardiac MRI   Severe aortic stenosis, low flow low gradient    Will need structural heart evaluation as OP   Mild vol OL noted   HTN-well controlled on imdur & lopressor   HLP, on statin, LDL 66  Prediabetes, A1C 5.9%-per primary    Asthma    Plan:    Denies anginal sx. Cardiac MRI completed today  Dr. Palumbo to discuss results with interventional cardiologist. Plan TBD   Continue asa, plavix, atorvastatin, lopressor    Mild volume overload noted on exam. Start lasix 20mg po daily     TIM Blanc  5/12/2025  10:38 AM  Ph [phone] (Edward)  Ph [phone] (Ramsay)      ===============================================  Seen/examined patient independently.  Agree with findings, assessment and plan as outlined above.   Some anginal symptoms yesterday, today resolved.  In regards to NSTEMI with  of the RCA, cardiac MR reviewed there is apical infarction, however the inferior appears viable; overall LVEF is 56%. Continue DAPT, anti-lipid therapy, anti-anginal therapy (BB, imdur).   Plan for  intervention with Dr Easton.   I have personally performed the medical decision making in its entirety.   Albert Palumbo, DO         [1]    predniSONE  20 mg Oral Daily with breakfast    clopidogrel  75 mg Oral Daily    magnesium oxide  400 mg Oral Daily    fluticasone-salmeterol  1 puff Inhalation BID    isosorbide mononitrate ER  30 mg Oral Daily    atorvastatin  40 mg Oral Nightly    metoprolol tartrate  25 mg Oral 2x Daily(Beta Blocker)    escitalopram  5 mg Oral Daily    latanoprost  1  drop Both Eyes Nightly    rOPINIRole HCl  2 mg Oral Nightly    ALPRAZolam  0.25 mg Oral Nightly   [2]

## 2025-05-12 NOTE — PLAN OF CARE
Problem: SAFETY ADULT - FALL  Goal: Free from fall injury  Description: INTERVENTIONS:- Assess pt frequently for physical needs- Identify cognitive and physical deficits and behaviors that affect risk of falls.- [place] fall precautions as indicated by assessment.- Educate pt/family on patient safety including physical limitations- Instruct pt to call for assistance with activity based on assessment- Modify environment to reduce risk of injury- Provide assistive devices as appropriate- Consider OT/PT consult to assist with strengthening/mobility- Encourage toileting schedule  Outcome: Progressing     Problem: PAIN - ADULT  Goal: Verbalizes/displays adequate comfort level or patient's stated pain goal  Description: INTERVENTIONS:- Encourage pt to monitor pain and request assistance- Assess pain using appropriate pain scale- Administer analgesics based on type and severity of pain and evaluate response- Implement non-pharmacological measures as appropriate and evaluate response- Consider cultural and social influences on pain and pain management- Manage/alleviate anxiety- Utilize distraction and/or relaxation techniques- Monitor for opioid side effects- Notify MD/LIP if interventions unsuccessful or patient reports new pain- Anticipate increased pain with activity and pre-medicate as appropriate  Outcome: Progressing     Problem: CARDIOVASCULAR - ADULT  Goal: Maintains optimal cardiac output and hemodynamic stability  Description: INTERVENTIONS:- Monitor vital signs, rhythm, and trends- Monitor for bleeding, hypotension and signs of decreased cardiac output- Evaluate effectiveness of vasoactive medications to optimize hemodynamic stability- Monitor arterial and/or venous puncture sites for bleeding and/or hematoma- Assess quality of pulses, skin color and temperature- Assess for signs of decreased coronary artery perfusion - ex. Angina- Evaluate fluid balance, assess for edema, trend weights  Outcome:  Progressing  Goal: Absence of cardiac arrhythmias or at baseline  Description: INTERVENTIONS:- Continuous cardiac monitoring, monitor vital signs, obtain 12 lead EKG if indicated- Evaluate effectiveness of antiarrhythmic and heart rate control medications as ordered- Initiate emergency measures for life threatening arrhythmias- Monitor electrolytes and administer replacement therapy as ordered  Outcome: Progressing     Problem: METABOLIC/FLUID AND ELECTROLYTES - ADULT  Goal: Glucose maintained within prescribed range  Description: INTERVENTIONS:- Monitor Blood Glucose as ordered- Assess for signs and symptoms of hyperglycemia and hypoglycemia- Administer ordered medications to maintain glucose within target range- Assess barriers to adequate nutritional intake and initiate nutrition consult as needed- Instruct patient on self management of diabetes  INTERVENTIONS:- Monitor Blood Glucose as ordered- Assess for signs and symptoms of hyperglycemia and hypoglycemia- Administer ordered medications to maintain glucose within target range- Assess barriers to adequate nutritional intake and initiate nutrition consult as needed- Instruct patient on self management of diabetes  Outcome: Progressing  Goal: Electrolytes maintained within normal limits  Description: INTERVENTIONS:- Monitor labs and rhythm and assess patient for signs and symptoms of electrolyte imbalances- Administer electrolyte replacement as ordered- Monitor response to electrolyte replacements, including rhythm and repeat lab results as appropriate- Fluid restriction as ordered- Instruct patient on fluid and nutrition restrictions as appropriate  Outcome: Progressing     Problem: Diabetes/Glucose Control  Goal: Glucose maintained within prescribed range  Description: INTERVENTIONS:- Monitor Blood Glucose as ordered- Assess for signs and symptoms of hyperglycemia and hypoglycemia- Administer ordered medications to maintain glucose within target range- Assess  barriers to adequate nutritional intake and initiate nutrition consult as needed- Instruct patient on self management of diabetes  INTERVENTIONS:- Monitor Blood Glucose as ordered- Assess for signs and symptoms of hyperglycemia and hypoglycemia- Administer ordered medications to maintain glucose within target range- Assess barriers to adequate nutritional intake and initiate nutrition consult as needed- Instruct patient on self management of diabetes  Outcome: Progressing    Received awake,oriented.Had some wheezing this morning-inhaler given.No complaints of pain during the night.Patient went down for cardiac MRI.

## 2025-05-12 NOTE — PLAN OF CARE
Pt alert and oriented x4. Independent. Cardiac MRI completed. Pt updated on plan of care. Plan to discharge when medically cleared. Safety precautions in place. Call light within reach.    Problem: SAFETY ADULT - FALL  Goal: Free from fall injury  Description: INTERVENTIONS:- Assess pt frequently for physical needs- Identify cognitive and physical deficits and behaviors that affect risk of falls.- [place] fall precautions as indicated by assessment.- Educate pt/family on patient safety including physical limitations- Instruct pt to call for assistance with activity based on assessment- Modify environment to reduce risk of injury- Provide assistive devices as appropriate- Consider OT/PT consult to assist with strengthening/mobility- Encourage toileting schedule  Outcome: Progressing     Problem: DISCHARGE PLANNING  Goal: Discharge to home or other facility with appropriate resources  Description: INTERVENTIONS:- Identify barriers to discharge w/pt and caregiver- Include patient/family/discharge partner in discharge planning- Arrange for needed discharge resources and transportation as appropriate- Identify discharge learning needs (meds, wound care, etc)- Arrange for interpreters to assist at discharge as needed- Consider post-discharge preferences of patient/family/discharge partner- Complete POLST form as appropriate- Assess patient's ability to be responsible for managing their own health- Refer to Case Management Department for coordinating discharge planning if the patient needs post-hospital services based on physician/LIP order or complex needs related to functional status, cognitive ability or social support system  Outcome: Progressing     Problem: CARDIOVASCULAR - ADULT  Goal: Maintains optimal cardiac output and hemodynamic stability  Description: INTERVENTIONS:- Monitor vital signs, rhythm, and trends- Monitor for bleeding, hypotension and signs of decreased cardiac output- Evaluate effectiveness of  vasoactive medications to optimize hemodynamic stability- Monitor arterial and/or venous puncture sites for bleeding and/or hematoma- Assess quality of pulses, skin color and temperature- Assess for signs of decreased coronary artery perfusion - ex. Angina- Evaluate fluid balance, assess for edema, trend weights  Outcome: Progressing  Goal: Absence of cardiac arrhythmias or at baseline  Description: INTERVENTIONS:- Continuous cardiac monitoring, monitor vital signs, obtain 12 lead EKG if indicated- Evaluate effectiveness of antiarrhythmic and heart rate control medications as ordered- Initiate emergency measures for life threatening arrhythmias- Monitor electrolytes and administer replacement therapy as ordered  Outcome: Progressing     Problem: METABOLIC/FLUID AND ELECTROLYTES - ADULT  Goal: Glucose maintained within prescribed range  Description: INTERVENTIONS:- Monitor Blood Glucose as ordered- Assess for signs and symptoms of hyperglycemia and hypoglycemia- Administer ordered medications to maintain glucose within target range- Assess barriers to adequate nutritional intake and initiate nutrition consult as needed- Instruct patient on self management of diabetes  INTERVENTIONS:- Monitor Blood Glucose as ordered- Assess for signs and symptoms of hyperglycemia and hypoglycemia- Administer ordered medications to maintain glucose within target range- Assess barriers to adequate nutritional intake and initiate nutrition consult as needed- Instruct patient on self management of diabetes  Outcome: Progressing  Goal: Electrolytes maintained within normal limits  Description: INTERVENTIONS:- Monitor labs and rhythm and assess patient for signs and symptoms of electrolyte imbalances- Administer electrolyte replacement as ordered- Monitor response to electrolyte replacements, including rhythm and repeat lab results as appropriate- Fluid restriction as ordered- Instruct patient on fluid and nutrition restrictions as  appropriate  Outcome: Progressing     Problem: Patient Centered Care  Goal: Patient preferences are identified and integrated in the patient's plan of care  Description: Interventions:- What would you like us to know as we care for you? - Provide timely, complete, and accurate information to patient/family- Incorporate patient and family knowledge, values, beliefs, and cultural backgrounds into the planning and delivery of care- Encourage patient/family to participate in care and decision-making at the level they choose- Honor patient and family perspectives and choices  Outcome: Progressing     Problem: Patient/Family Goals  Goal: Patient/Family Long Term Goal  Description: Patient's Long Term Goal: discharge  Interventions:  - Monitor vital signs  - Post cath assessments  - Cardiac MRI  - See additional Care Plan goals for specific interventions  Outcome: Progressing  Goal: Patient/Family Short Term Goal  Description: Patient's Short Term Goal: feel better  Interventions:  - Ambulate as tolerated  - Continue ADLs  - See additional Care Plan goals for specific interventions  Outcome: Progressing     Problem: PAIN - ADULT  Goal: Verbalizes/displays adequate comfort level or patient's stated pain goal  Description: INTERVENTIONS:- Encourage pt to monitor pain and request assistance- Assess pain using appropriate pain scale- Administer analgesics based on type and severity of pain and evaluate response- Implement non-pharmacological measures as appropriate and evaluate response- Consider cultural and social influences on pain and pain management- Manage/alleviate anxiety- Utilize distraction and/or relaxation techniques- Monitor for opioid side effects- Notify MD/LIP if interventions unsuccessful or patient reports new pain- Anticipate increased pain with activity and pre-medicate as appropriate  Outcome: Progressing     Problem: Diabetes/Glucose Control  Goal: Glucose maintained within prescribed range  Description:  INTERVENTIONS:- Monitor Blood Glucose as ordered- Assess for signs and symptoms of hyperglycemia and hypoglycemia- Administer ordered medications to maintain glucose within target range- Assess barriers to adequate nutritional intake and initiate nutrition consult as needed- Instruct patient on self management of diabetes  INTERVENTIONS:- Monitor Blood Glucose as ordered- Assess for signs and symptoms of hyperglycemia and hypoglycemia- Administer ordered medications to maintain glucose within target range- Assess barriers to adequate nutritional intake and initiate nutrition consult as needed- Instruct patient on self management of diabetes  Outcome: Progressing

## 2025-05-12 NOTE — DISCHARGE PLANNING
Pt ready and cleared for discharge. IV and tele removed. Pt education and paperwork provided. All questions answered. Pt taken downstairs in wheelchair and driving herself home.

## 2025-05-13 NOTE — DISCHARGE SUMMARY
[facility]     Sophia Almanzar Patient Status:  Inpatient    10/25/1945 MRN [id number]   Location [facility] 3W/SW Attending No att. providers found   Hosp Day # 4 PCP SOPHIA DICKERSON MD     Date of Admission: 2025  Date of Discharge: 2025  Discharge Disposition: Home or Self Care    Admitting Chief Complaint:   Acute asthma ([facility]) [J45.909]  Acute chest pain [R07.9]  NSTEMI (non-ST elevated myocardial infarction) ([facility]) [I21.4]    PCP: SOPHIA DICKERSON MD    Discharge Diagnosis:   Non-ST-elevation myocardial infarction.  Severe aortic stenosis        Mild asthma exacerbation.   Essential hypertension.   Severe anxiety disorder.  RLS                 History of Present Illness:   Per Dr. Yuen  Patient is a 79-year-old  female, who came into the emergency department for evaluation initially for wheezing since last night. Then, she told the ER physician she was having also chest pain. CBC was unremarkable. Troponin was 8078. EKG showed inferior T-wave ST depression and lateral ST depression. Basic metabolic panel, liver function tests, and second troponin are still pending. D-dimer was negative. Chest x-ray showed no acute findings. Started on IV nitroglycerin, aspirin, and IV heparin, and she will be taken to the catheterization lab for further investigation.           Brief Synopsis:   Non-ST-elevation myocardial infarction.  -initial trops >8,000  -likely late presentation  -cardiology consulted, seen by Dr. Palumbo, then  underwent emergent LHC with Dr. Easton  showing: Mild to moderate disease of the left system. 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 Normal LVEDP. Mild hypokinesis of inferior wall.   -->recommend: Viability for the inferior wall, based on LV angiogram it appears that the inferior wall is viable. Would need retrograde intervention of the right coronary artery due to flush occlusion at the  ostium.   -MRI pending  -ECHO abn-->EF 55-60%, mod concentric hypertrophy, a reversible restrictive pattern, indicative of decreased left ventricular diastolic compliance and/or    increased left atrial pressure - grade 3 diastolic dysfunction. RVSP is elevated, 50-55mmHg, suggestive of at least moderate pulmonary hypertension, severe low-flow low-gradient aortic stenosis.        -tele  -cont BB for now--> pt reports intermittent wheezing that she attributes to metoprolol. She takes metoprolol at home bid with occasional wheeze that is going away within hr or so.  -seems like her wheezing correlates actually with her anxiety   -s/p heparin ggt  -s/p  mg in ER (in the past no issues with ASA 81 mg, but stopped taking >10 yrs ago)  -now on plavix ordered per cardiology  -NTG prn  -reported allergy to NSAIDs (naproxen), also to ACEI (lisinopril), amlodipine, diltiazem  -abn cardiac MRI with apical infarction, however the inferior appears viable; overall LVEF is 56%.   -cardiology recommends: Continue DAPT, anti-lipid therapy, anti-anginal therapy (BB, imdur). Plan for  intervention with Dr Easton.      Headache  Seems tension type, no neuro deficits, might be related to NTG as well  -supportive     Mild asthma exacerbation.  -seen by pulmonary  -restart inhalers, short course of steroids taper with improvement  -f/u in pulmonary clinic with Dr. Stewart      Essential hypertension.   -BP ok, monitor     Severe anxiety disorder.  RLS  -cont meds, supportive     A1c 5.9     Abn LFT, mild  -repeat better     HA-->supportive     Social: lives alone     Dispo: home                    Discharge Medication List:     Discharge Medications        START taking these medications        Instructions Prescription details   aspirin 81 MG Tbec      Take 1 tablet (81 mg total) by mouth daily.   Quantity: 30 tablet  Refills: 0     clopidogrel 75 MG Tabs  Commonly known as: Plavix      Take 1 tablet (75 mg total) by mouth  daily.   Quantity: 30 tablet  Refills: 0     furosemide 20 MG Tabs  Commonly known as: Lasix      Take 1 tablet (20 mg total) by mouth daily.   Quantity: 30 tablet  Refills: 0     isosorbide mononitrate ER 30 MG Tb24  Commonly known as: Imdur      Take 1 tablet (30 mg total) by mouth daily.   Quantity: 30 tablet  Refills: 0     predniSONE 20 MG Tabs  Commonly known as: Deltasone      Take 1 tablet (20 mg total) by mouth daily with breakfast.   Quantity: 4 tablet  Refills: 0            CHANGE how you take these medications        Instructions Prescription details   ALPRAZolam 0.25 MG Tabs  Commonly known as: Xanax  What changed: when to take this      Take 1 tablet (0.25 mg total) by mouth nightly as needed.   Refills: 0     Levalbuterol Tartrate 45 MCG/ACT Aero  Commonly known as: XOPENEX HFA  What changed:   when to take this  reasons to take this      Inhale 2 puffs into the lungs every 6 (six) hours as needed for Wheezing.   Refills: 0     rOPINIRole HCl 3 MG Tabs  Commonly known as: REQUIP  What changed: Another medication with the same name was removed. Continue taking this medication, and follow the directions you see here.       Refills: 0            CONTINUE taking these medications        Instructions Prescription details   Breo Ellipta 200-25 MCG/ACT Aepb  Generic drug: fluticasone furoate-vilanterol      Inhale 1 puff into the lungs daily.   Refills: 0     escitalopram 5 MG Tabs  Commonly known as: Lexapro      Take 1 tablet (5 mg total) by mouth in the morning.   Refills: 0     HYDROcodone-acetaminophen 5-325 MG Tabs  Commonly known as: Norco       Refills: 0     latanoprost 0.005 % Soln  Commonly known as: Xalatan       Refills: 0     metoprolol succinate ER 25 MG Tb24  Commonly known as: Toprol XL      Take 1 tablet (25 mg total) by mouth in the morning and 1 tablet (25 mg total) before bedtime.   Refills: 0     rosuvastatin 20 MG Tabs  Commonly known as: Crestor       Refills: 0     Valtrex 1 g  Tabs  Generic drug: valACYclovir       Refills: 5            STOP taking these medications      ciprofloxacin 0.3 % Soln  Commonly known as: Ciloxan        hydroCHLOROthiazide 25 MG Tabs        mupirocin 2 % Oint  Commonly known as: Bactroban        prednisoLONE 1 % Susp  Commonly known as: Pred Forte                  Where to Get Your Medications        These medications were sent to [id number] - [place], [place] - [address] AT [address] [facility] [phone], [phone]  [address], [facility] [address]      Phone: [phone]   aspirin 81 MG Tbec  clopidogrel 75 MG Tabs  furosemide 20 MG Tabs  isosorbide mononitrate ER 30 MG Tb24  predniSONE 20 MG Tabs         Follow-up appointment:   Albert Tristan DO  [address]  [facility] [address]  [phone]    Follow up on 5/15/2025  Thursday, May 15th @ 11AM as scheduled    Vega Stewart DO  [address]  [facility] [address]  [phone]    Schedule an appointment as soon as possible for a visit in 2 week(s)      Thania Gonzalez MD  [address] [place] [address]  [facility] [address] [phone]    Schedule an appointment as soon as possible for a visit in 1 week(s)  post hospitalization follow up      Vital signs:  Temp:  [98.1 °F (36.7 °C)-98.5 °F (36.9 °C)] 98.5 °F (36.9 °C)  Pulse:  [74-76] 74  Resp:  [18] 18  BP: (124-175)/(61-64) 127/64  SpO2:  [93 %-94 %] 93 %    Physical Exam:    General: No acute distress. Overall much better, very anxious   Respiratory: Clear to auscultation bilaterally. No wheezes. No rhonchi.  Cardiovascular: S1, S2. Regular rate and rhythm. No murmurs, rubs or gallops.   Abdomen: Soft, nontender, nondistended.  Positive bowel sounds. No rebound or guarding.  Neurologic: No new focal neurological deficits.   Musculoskeletal: Moves all extremities.  Extremities: No  edema.  -----------------------------------------------------------------------------------------------  PATIENT DISCHARGE INSTRUCTIONS: See electronic chart    I d/w pt  the available results, management plan, medications changes, and discharge instructions including follow ups as outlined above.   Scripts sent to pharmacy.      Hospital Discharge Diagnoses:  NSTEMI    Lace+ Score: 66  59-90 High Risk  29-58 Medium Risk  0-28   Low Risk.    TCM Follow-Up Recommendation:  LACE > 58: High Risk of readmission after discharge from the hospital.        NORMA MUELLER MD 5/13/2025    Time spent:  > 30 minutes

## 2025-05-16 ENCOUNTER — LAB ENCOUNTER (OUTPATIENT)
Dept: LAB | Age: 80
End: 2025-05-16
Attending: INTERNAL MEDICINE
Payer: MEDICARE

## 2025-05-16 DIAGNOSIS — I21.4 NSTEMI (NON-ST ELEVATED MYOCARDIAL INFARCTION) (HCC): ICD-10-CM

## 2025-05-16 DIAGNOSIS — R07.9 ACUTE CHEST PAIN: Primary | ICD-10-CM

## 2025-05-16 LAB
ANION GAP SERPL CALC-SCNC: 7 MMOL/L (ref 0–18)
BUN BLD-MCNC: 17 MG/DL (ref 9–23)
BUN/CREAT SERPL: 21.3 (ref 10–20)
CALCIUM BLD-MCNC: 9.3 MG/DL (ref 8.7–10.4)
CHLORIDE SERPL-SCNC: 106 MMOL/L (ref 98–112)
CO2 SERPL-SCNC: 31 MMOL/L (ref 21–32)
CREAT BLD-MCNC: 0.8 MG/DL (ref 0.55–1.02)
EGFRCR SERPLBLD CKD-EPI 2021: 75 ML/MIN/1.73M2 (ref 60–?)
FASTING STATUS PATIENT QL REPORTED: NO
GLUCOSE BLD-MCNC: 100 MG/DL (ref 70–99)
OSMOLALITY SERPL CALC.SUM OF ELEC: 300 MOSM/KG (ref 275–295)
POTASSIUM SERPL-SCNC: 4.6 MMOL/L (ref 3.5–5.1)
SODIUM SERPL-SCNC: 144 MMOL/L (ref 136–145)

## 2025-05-16 PROCEDURE — 36415 COLL VENOUS BLD VENIPUNCTURE: CPT

## 2025-05-16 PROCEDURE — 80048 BASIC METABOLIC PNL TOTAL CA: CPT

## 2025-06-24 ENCOUNTER — ORDER TRANSCRIPTION (OUTPATIENT)
Dept: ADMINISTRATIVE | Facility: HOSPITAL | Age: 80
End: 2025-06-24

## 2025-06-24 DIAGNOSIS — I21.4 NSTEMI (NON-ST ELEVATED MYOCARDIAL INFARCTION) (HCC): ICD-10-CM

## 2025-06-24 DIAGNOSIS — Z86.79 PERSONAL HISTORY OF UNSPECIFIED CIRCULATORY DISEASE: Primary | ICD-10-CM

## 2025-07-21 ENCOUNTER — APPOINTMENT (OUTPATIENT)
Dept: GENERAL RADIOLOGY | Facility: HOSPITAL | Age: 80
End: 2025-07-21
Attending: INTERNAL MEDICINE
Payer: MEDICARE

## 2025-07-21 ENCOUNTER — LAB ENCOUNTER (OUTPATIENT)
Dept: LAB | Facility: HOSPITAL | Age: 80
End: 2025-07-21
Attending: INTERNAL MEDICINE
Payer: MEDICARE

## 2025-07-21 ENCOUNTER — HOSPITAL ENCOUNTER (OUTPATIENT)
Dept: GENERAL RADIOLOGY | Facility: HOSPITAL | Age: 80
Discharge: HOME | End: 2025-07-21
Attending: INTERNAL MEDICINE
Payer: MEDICARE

## 2025-07-21 DIAGNOSIS — I21.4 NSTEMI (NON-ST ELEVATED MYOCARDIAL INFARCTION) (HCC): ICD-10-CM

## 2025-07-21 DIAGNOSIS — Z01.818 PRE-OP TESTING: ICD-10-CM

## 2025-07-21 DIAGNOSIS — I21.4 ACUTE MYOCARDIAL INFARCTION, SUBENDOCARDIAL INFARCTION, INITIAL EPISODE OF CARE (HCC): Primary | ICD-10-CM

## 2025-07-21 DIAGNOSIS — Z01.818 PREOP EXAMINATION: ICD-10-CM

## 2025-07-21 DIAGNOSIS — I21.4 ACUTE MYOCARDIAL INFARCTION, SUBENDOCARDIAL INFARCTION, INITIAL EPISODE OF CARE (HCC): ICD-10-CM

## 2025-07-21 LAB
ANION GAP SERPL CALC-SCNC: 5 MMOL/L (ref 0–18)
ATRIAL RATE: 70 BPM
BASOPHILS # BLD AUTO: 0.04 X10(3) UL (ref 0–0.2)
BASOPHILS NFR BLD AUTO: 0.8 %
BUN BLD-MCNC: 17 MG/DL (ref 9–23)
BUN/CREAT SERPL: 27.4 (ref 10–20)
CALCIUM BLD-MCNC: 9.5 MG/DL (ref 8.7–10.4)
CHLORIDE SERPL-SCNC: 107 MMOL/L (ref 98–112)
CO2 SERPL-SCNC: 28 MMOL/L (ref 21–32)
CREAT BLD-MCNC: 0.62 MG/DL (ref 0.55–1.02)
DEPRECATED RDW RBC AUTO: 44.2 FL (ref 35.1–46.3)
EGFRCR SERPLBLD CKD-EPI 2021: 91 ML/MIN/1.73M2 (ref 60–?)
EOSINOPHIL # BLD AUTO: 0.23 X10(3) UL (ref 0–0.7)
EOSINOPHIL NFR BLD AUTO: 4.4 %
ERYTHROCYTE [DISTWIDTH] IN BLOOD BY AUTOMATED COUNT: 13 % (ref 11–15)
FASTING STATUS PATIENT QL REPORTED: YES
GLUCOSE BLD-MCNC: 100 MG/DL (ref 70–99)
HCT VFR BLD AUTO: 43.8 % (ref 35–48)
HGB BLD-MCNC: 13.8 G/DL (ref 12–16)
IMM GRANULOCYTES # BLD AUTO: 0 X10(3) UL (ref 0–1)
IMM GRANULOCYTES NFR BLD: 0 %
LYMPHOCYTES # BLD AUTO: 1.36 X10(3) UL (ref 1–4)
LYMPHOCYTES NFR BLD AUTO: 26.2 %
MCH RBC QN AUTO: 28.9 PG (ref 26–34)
MCHC RBC AUTO-ENTMCNC: 31.5 G/DL (ref 31–37)
MCV RBC AUTO: 91.6 FL (ref 80–100)
MONOCYTES # BLD AUTO: 0.61 X10(3) UL (ref 0.1–1)
MONOCYTES NFR BLD AUTO: 11.8 %
NEUTROPHILS # BLD AUTO: 2.95 X10 (3) UL (ref 1.5–7.7)
NEUTROPHILS # BLD AUTO: 2.95 X10(3) UL (ref 1.5–7.7)
NEUTROPHILS NFR BLD AUTO: 56.8 %
OSMOLALITY SERPL CALC.SUM OF ELEC: 292 MOSM/KG (ref 275–295)
P AXIS: 72 DEGREES
P-R INTERVAL: 148 MS
PLATELET # BLD AUTO: 224 10(3)UL (ref 150–450)
POTASSIUM SERPL-SCNC: 4.5 MMOL/L (ref 3.5–5.1)
Q-T INTERVAL: 410 MS
QRS DURATION: 88 MS
QTC CALCULATION (BEZET): 442 MS
R AXIS: 60 DEGREES
RBC # BLD AUTO: 4.78 X10(6)UL (ref 3.8–5.3)
SODIUM SERPL-SCNC: 140 MMOL/L (ref 136–145)
T AXIS: 81 DEGREES
VENTRICULAR RATE: 70 BPM
WBC # BLD AUTO: 5.2 X10(3) UL (ref 4–11)

## 2025-07-21 PROCEDURE — 93010 ELECTROCARDIOGRAM REPORT: CPT | Performed by: INTERNAL MEDICINE

## 2025-07-21 PROCEDURE — 85025 COMPLETE CBC W/AUTO DIFF WBC: CPT

## 2025-07-21 PROCEDURE — 93005 ELECTROCARDIOGRAM TRACING: CPT

## 2025-07-21 PROCEDURE — 36415 COLL VENOUS BLD VENIPUNCTURE: CPT

## 2025-07-21 PROCEDURE — 80048 BASIC METABOLIC PNL TOTAL CA: CPT

## 2025-07-21 PROCEDURE — 71046 X-RAY EXAM CHEST 2 VIEWS: CPT | Performed by: INTERNAL MEDICINE

## 2025-07-24 ENCOUNTER — HOSPITAL ENCOUNTER (OUTPATIENT)
Dept: INTERVENTIONAL RADIOLOGY/VASCULAR | Facility: HOSPITAL | Age: 80
Setting detail: OBSERVATION
Discharge: HOME | End: 2025-07-25
Attending: INTERNAL MEDICINE | Admitting: INTERNAL MEDICINE

## 2025-07-24 DIAGNOSIS — I25.82 CHRONIC TOTAL OCCLUSION OF CORONARY ARTERY: ICD-10-CM

## 2025-07-24 DIAGNOSIS — I25.10 CAD (CORONARY ARTERY DISEASE): ICD-10-CM

## 2025-07-24 LAB
ATRIAL RATE: 69 BPM
ISTAT ACTIVATED CLOTTING TIME: 273 SECONDS (ref 125–137)
ISTAT ACTIVATED CLOTTING TIME: 285 SECONDS (ref 125–137)
ISTAT ACTIVATED CLOTTING TIME: 325 SECONDS (ref 125–137)
P AXIS: 64 DEGREES
P-R INTERVAL: 146 MS
Q-T INTERVAL: 428 MS
QRS DURATION: 88 MS
QTC CALCULATION (BEZET): 458 MS
R AXIS: 47 DEGREES
T AXIS: 113 DEGREES
VENTRICULAR RATE: 69 BPM

## 2025-07-24 PROCEDURE — 99222 1ST HOSP IP/OBS MODERATE 55: CPT | Performed by: HOSPITALIST

## 2025-07-24 RX ORDER — SODIUM CHLORIDE 9 MG/ML
3 INJECTION, SOLUTION INTRAVENOUS
Status: COMPLETED | OUTPATIENT
Start: 2025-07-24 | End: 2025-07-24

## 2025-07-24 RX ORDER — ROPINIROLE 1 MG/1
3 TABLET, FILM COATED ORAL ONCE
Status: COMPLETED | OUTPATIENT
Start: 2025-07-24 | End: 2025-07-24

## 2025-07-24 RX ORDER — ESCITALOPRAM OXALATE 5 MG/1
5 TABLET ORAL DAILY
Status: DISCONTINUED | OUTPATIENT
Start: 2025-07-24 | End: 2025-07-25

## 2025-07-24 RX ORDER — LIDOCAINE HYDROCHLORIDE 20 MG/ML
INJECTION, SOLUTION INFILTRATION; PERINEURAL
Status: COMPLETED
Start: 2025-07-24 | End: 2025-07-24

## 2025-07-24 RX ORDER — ROPINIROLE 1 MG/1
3 TABLET, FILM COATED ORAL NIGHTLY
Status: DISCONTINUED | OUTPATIENT
Start: 2025-07-25 | End: 2025-07-25

## 2025-07-24 RX ORDER — HYDROCODONE BITARTRATE AND ACETAMINOPHEN 5; 325 MG/1; MG/1
1 TABLET ORAL EVERY 4 HOURS PRN
Refills: 0 | Status: DISCONTINUED | OUTPATIENT
Start: 2025-07-24 | End: 2025-07-25

## 2025-07-24 RX ORDER — HEPARIN SODIUM 1000 [USP'U]/ML
INJECTION, SOLUTION INTRAVENOUS; SUBCUTANEOUS
Status: COMPLETED
Start: 2025-07-24 | End: 2025-07-24

## 2025-07-24 RX ORDER — ACETAMINOPHEN 325 MG/1
650 TABLET ORAL EVERY 4 HOURS PRN
Status: DISCONTINUED | OUTPATIENT
Start: 2025-07-24 | End: 2025-07-25

## 2025-07-24 RX ORDER — CLOPIDOGREL BISULFATE 75 MG/1
525 TABLET ORAL DAILY
Status: DISCONTINUED | OUTPATIENT
Start: 2025-07-24 | End: 2025-07-25

## 2025-07-24 RX ORDER — MIDAZOLAM HYDROCHLORIDE 1 MG/ML
INJECTION INTRAMUSCULAR; INTRAVENOUS
Status: COMPLETED
Start: 2025-07-24 | End: 2025-07-24

## 2025-07-24 RX ORDER — PROTAMINE SULFATE 10 MG/ML
INJECTION, SOLUTION INTRAVENOUS
Status: COMPLETED
Start: 2025-07-24 | End: 2025-07-24

## 2025-07-24 RX ORDER — CYCLOBENZAPRINE HCL 5 MG
5 TABLET ORAL 3 TIMES DAILY PRN
Status: DISCONTINUED | OUTPATIENT
Start: 2025-07-24 | End: 2025-07-25

## 2025-07-24 RX ORDER — ACETAMINOPHEN 500 MG
500 TABLET ORAL EVERY 6 HOURS PRN
Status: DISCONTINUED | OUTPATIENT
Start: 2025-07-24 | End: 2025-07-25

## 2025-07-24 RX ORDER — ONDANSETRON 2 MG/ML
4 INJECTION INTRAMUSCULAR; INTRAVENOUS EVERY 6 HOURS PRN
Status: DISCONTINUED | OUTPATIENT
Start: 2025-07-24 | End: 2025-07-25

## 2025-07-24 RX ORDER — ACETAMINOPHEN 500 MG
500 TABLET ORAL EVERY 4 HOURS PRN
Status: DISCONTINUED | OUTPATIENT
Start: 2025-07-24 | End: 2025-07-25

## 2025-07-24 RX ORDER — HEPARIN SODIUM 5000 [USP'U]/ML
5000 INJECTION, SOLUTION INTRAVENOUS; SUBCUTANEOUS EVERY 12 HOURS SCHEDULED
Status: DISCONTINUED | OUTPATIENT
Start: 2025-07-24 | End: 2025-07-25

## 2025-07-24 RX ORDER — IOPAMIDOL 612 MG/ML
330 INJECTION, SOLUTION INTRAVASCULAR
Status: COMPLETED | OUTPATIENT
Start: 2025-07-24 | End: 2025-07-24

## 2025-07-24 RX ORDER — NITROGLYCERIN 20 MG/100ML
INJECTION INTRAVENOUS
Status: COMPLETED
Start: 2025-07-24 | End: 2025-07-24

## 2025-07-24 RX ORDER — HYDRALAZINE HYDROCHLORIDE 20 MG/ML
INJECTION INTRAMUSCULAR; INTRAVENOUS
Status: COMPLETED
Start: 2025-07-24 | End: 2025-07-24

## 2025-07-24 RX ORDER — CLOPIDOGREL BISULFATE 75 MG/1
TABLET ORAL
Status: COMPLETED
Start: 2025-07-24 | End: 2025-07-24

## 2025-07-24 RX ORDER — ISOSORBIDE MONONITRATE 30 MG/1
30 TABLET, EXTENDED RELEASE ORAL DAILY
Status: DISCONTINUED | OUTPATIENT
Start: 2025-07-24 | End: 2025-07-25

## 2025-07-24 RX ORDER — ASPIRIN 81 MG/1
324 TABLET, CHEWABLE ORAL ONCE
Status: DISCONTINUED | OUTPATIENT
Start: 2025-07-24 | End: 2025-07-25

## 2025-07-24 RX ORDER — METOPROLOL SUCCINATE 25 MG/1
25 TABLET, EXTENDED RELEASE ORAL 2 TIMES DAILY
Status: DISCONTINUED | OUTPATIENT
Start: 2025-07-24 | End: 2025-07-25

## 2025-07-24 RX ORDER — METOCLOPRAMIDE HYDROCHLORIDE 5 MG/ML
5 INJECTION INTRAMUSCULAR; INTRAVENOUS EVERY 8 HOURS PRN
Status: DISCONTINUED | OUTPATIENT
Start: 2025-07-24 | End: 2025-07-25

## 2025-07-24 RX ORDER — CHLORHEXIDINE GLUCONATE 40 MG/ML
SOLUTION TOPICAL
Status: COMPLETED | OUTPATIENT
Start: 2025-07-24 | End: 2025-07-24

## 2025-07-24 RX ORDER — ROSUVASTATIN CALCIUM 20 MG/1
20 TABLET, COATED ORAL NIGHTLY
Status: DISCONTINUED | OUTPATIENT
Start: 2025-07-24 | End: 2025-07-25

## 2025-07-24 RX ORDER — TEMAZEPAM 15 MG/1
15 CAPSULE ORAL NIGHTLY PRN
Status: DISCONTINUED | OUTPATIENT
Start: 2025-07-24 | End: 2025-07-25

## 2025-07-24 RX ORDER — HYDROCODONE BITARTRATE AND ACETAMINOPHEN 5; 325 MG/1; MG/1
2 TABLET ORAL EVERY 4 HOURS PRN
Refills: 0 | Status: DISCONTINUED | OUTPATIENT
Start: 2025-07-24 | End: 2025-07-25

## 2025-07-24 RX ORDER — DIPHENHYDRAMINE HYDROCHLORIDE 50 MG/ML
INJECTION, SOLUTION INTRAMUSCULAR; INTRAVENOUS
Status: COMPLETED
Start: 2025-07-24 | End: 2025-07-24

## 2025-07-24 RX ADMIN — ROPINIROLE 3 MG: 1 TABLET, FILM COATED ORAL at 12:30:00

## 2025-07-24 RX ADMIN — SODIUM CHLORIDE 3 ML/KG/HR: 9 INJECTION, SOLUTION INTRAVENOUS at 07:00:00

## 2025-07-24 RX ADMIN — ROSUVASTATIN CALCIUM 20 MG: 20 TABLET, COATED ORAL at 20:31:00

## 2025-07-24 RX ADMIN — HEPARIN SODIUM 5000 UNITS: 5000 INJECTION, SOLUTION INTRAVENOUS; SUBCUTANEOUS at 20:31:00

## 2025-07-24 RX ADMIN — CHLORHEXIDINE GLUCONATE: 40 SOLUTION TOPICAL at 07:00:00

## 2025-07-24 RX ADMIN — CLOPIDOGREL BISULFATE 525 MG: 75 TABLET ORAL at 07:45:00

## 2025-07-24 RX ADMIN — ACETAMINOPHEN 500 MG: 500 MG TABLET ORAL at 17:24:00

## 2025-07-24 RX ADMIN — ROPINIROLE 3 MG: 1 TABLET, FILM COATED ORAL at 21:43:00

## 2025-07-24 RX ADMIN — IOPAMIDOL 170 ML: 612 INJECTION, SOLUTION INTRAVASCULAR at 10:07:00

## 2025-07-24 RX ADMIN — METOPROLOL SUCCINATE 25 MG: 25 TABLET, EXTENDED RELEASE ORAL at 20:31:00

## 2025-07-24 NOTE — PLAN OF CARE
Patient is resting post procedure, will be obs overnight for a TAVR workup. BR till 1705.      Problem: Patient Centered Care  Goal: Patient preferences are identified and integrated in the patient's plan of care  Description: Interventions:  - What would you like us to know as we care for you? I live at home alone  - Provide timely, complete, and accurate information to patient/family  - Incorporate patient and family knowledge, values, beliefs, and cultural backgrounds into the planning and delivery of care  - Encourage patient/family to participate in care and decision-making at the level they choose  - Honor patient and family perspectives and choices  Outcome: Progressing     Problem: Patient/Family Goals  Goal: Patient/Family Long Term Goal  Description: Patient's Long Term Goal: to go home    Interventions:  - tavr  - See additional Care Plan goals for specific interventions  Outcome: Progressing  Goal: Patient/Family Short Term Goal  Description: Patient's Short Term Goal: to feel better     Interventions:   - tavr  - See additional Care Plan goals for specific interventions  Outcome: Progressing     Problem: CARDIOVASCULAR - ADULT  Goal: Maintains optimal cardiac output and hemodynamic stability  Description: INTERVENTIONS:  - Monitor vital signs, rhythm, and trends  - Monitor for bleeding, hypotension and signs of decreased cardiac output  - Evaluate effectiveness of vasoactive medications to optimize hemodynamic stability  - Monitor arterial and/or venous puncture sites for bleeding and/or hematoma  - Assess quality of pulses, skin color and temperature  - Assess for signs of decreased coronary artery perfusion - ex. Angina  - Evaluate fluid balance, assess for edema, trend weights  Outcome: Progressing     Problem: CARDIOVASCULAR - ADULT  Goal: Maintains optimal cardiac output and hemodynamic stability  Description: INTERVENTIONS:  - Monitor vital signs, rhythm, and trends  - Monitor for bleeding,  hypotension and signs of decreased cardiac output  - Evaluate effectiveness of vasoactive medications to optimize hemodynamic stability  - Monitor arterial and/or venous puncture sites for bleeding and/or hematoma  - Assess quality of pulses, skin color and temperature  - Assess for signs of decreased coronary artery perfusion - ex. Angina  - Evaluate fluid balance, assess for edema, trend weights  Outcome: Progressing  Goal: Absence of cardiac arrhythmias or at baseline  Description: INTERVENTIONS:  - Continuous cardiac monitoring, monitor vital signs, obtain 12 lead EKG if indicated  - Evaluate effectiveness of antiarrhythmic and heart rate control medications as ordered  - Initiate emergency measures for life threatening arrhythmias  - Monitor electrolytes and administer replacement therapy as ordered  Outcome: Progressing

## 2025-07-24 NOTE — DISCHARGE INSTRUCTIONS
HOME CARE INSTRUCTIONS FOLLOWING CORONARY ANGIOGRAPHY (LEFT HEART CATHETERIZATION)    Activity  DO NOT drive after the procedure.  You may resume driving late the following day according to the nurse or physician's instructions  Plan on resting and relaxing tonight and tomorrow  Avoid drinking alcohol for the next 24 hours  Resume your normal activity after 48 hours, or as instructed by your physician  Do not lift anything over 10 pounds for 1 week  Avoid repeated stair use and excessive walking for the next 24 hours  Avoid repetitive squatting or bending exercises/motions for 1 week.     What is Normal?  A small lump at the procedure site associated with mild tenderness when touched  The procedure site may be bruised or discolored  There may be a small amount of drainage on the bandage    Special Instructions   Drink plenty of fluids during the next 24 hours to \"flush\" the contrast from your system  Keep the bandage clean and dry  After 24 hours, remove the bandage.  Remove the dressing anytime tomorrow after __0000__.  You may shower after removal of the bandage, wash the procedure site gently with soap and water  If you choose to wear a bandage for a few days, make sure it remains clean and dry and that it is changed daily  Do NOT apply any other powders, ointments or creams to the site for 1 week.  DO NOT submerge the procedure site for 1 week (no bath tubs or pools)    Additional Instructions:  Do not take glucophage/metformin containing products for at least 48 hours after procedure, unless otherwise directed by your physician.    When you should NOTIFY YOUR PHYSICIAN  Bleeding can occur at the procedure site - both on the outside of the skin and/or beneath the surface of the skin  Swelling or a large lump at the procedure site can occur, which may be accompanied by moderate to severe pain    If either of the above occurs, lie down flat.  Have someone apply pressure to the procedure site with both hands, as  instructed by the nurse.  Hold pressure for 20 minutes and the bleeding should stop.  Notify your physician of the occurrence  If the bleeding does not stop, call 911 and continue to apply pressure    If you experience signs of a fever, temperature > 101°, chills, infection (redness, swelling, thick yellow drainage, or a foul odor from the procedure site)  If you notice any numbness, tingling, or loss of feeling to your leg or foot or groin access    Other  You may resume your present diet, unless otherwise specified by your physician.  You may resume all of your medications as prescribed, unless otherwise directed by your physician.  A list of your medications was provided to you at discharge.  Gradually resume your previous aerobic exercise schedule as tolerated.    Additional Instructions:  Your physician used a Closure device to close the incision site:  Type of closure used: Perclose    See appropriate pamphlet information, if applicable  You may feel a \"pea or olive pit\" sized lump in the groin area  The collagen will be absorbed (broken down) by your body in approximately 6-12 weeks.    *If you have any question or concern, please call the on-call nurse at [phone]

## 2025-07-24 NOTE — PROCEDURES
[facility]  part of [facility]    Cardiac Cath Procedure Note    Sophia Almanzar Patient Status:  Outpatient    10/25/1945 MRN [id number]   Location [facility] INTERVENTIONAL SUITES Attending Saeed Easton MD   Hosp Day # 0 PCP SOPHIA DICKERSON MD       Cardiologist: Dany Barroso MD  Primary Proceduralist: Dany Barroso MD  Procedure Performed: LHC and failed  PCI RCA  Date of Procedure: 2025   Indication: Refractory angina    Summary of procedure:    Failed  PCI RCA, unable to find septal retrograde to RCA.  Stopped probing septals due to LAD vasospasm, ST elevations and hypotension in the setting of likely significant aortic stenosis.      Recommendations:  Observe overnight  Will discuss TAVR workup first, understanding that RCA may be jailed  This alone may improve her symptoms, if symptoms continue despite TAVR then she will likely tolerate a  PCI RCA much better in the future        Coronary intervention:  RCA  intervention: Probed multiple septals with Eva carbone, macie Tanner fielder Xtr however unable to find usable collateral to the PDA or PL.  Procedure stopped due to LAD vasospasm around corsair, diffuse ST elevations and hypotension.      Description of Procedure:   After written informed consent was obtained from the patient, patient was brought to the cardiac catheterization laboratory.  Patient was prepped and draped in the usual sterile fashion. Lidocaine 1% was used to infiltrate the left and right groin for local anesthesia and a 7 and 6 Upper sorbian introducer sheath was inserted into the left and right femoral artery via ultrasound guidance and micropuncture kit.      Attempted  intervention as above    Selective left and right femoral angiogram done assess anatomy for closure.      Specimen sent to: No specimen collected  Estimated blood loss: 10 cc  Closure:  Perclose bilaterally      IV was maintained by RN and moderate conscious sedation of  versed and fentanyl was given.  Patient was assessed and monitoring of oxygen, heart rate and blood pressure by nurse and myself during the exam 90 minutes.      Dany Barroso MD  07/24/25

## 2025-07-24 NOTE — IVS NOTE
Pt tolerated fluids and food.   Procedural site remains dry and intact with good circulation, motion and sensation.   No signs or symptoms of bleeding/hematoma noted.   Pt denies any pain or discomfort at this time.  Instructions provided, patient/family verbalizes understanding.   Dr. Barroso spoke with patient/family post procedure.     Pt discharge via stretcher to Nursing Facility     Follow up Appointment:  7/29 @ 1pm with Zayra DUMONT      New Prescription: N/A

## 2025-07-24 NOTE — IVS NOTE
Bedside handoff to Angela RN  Pt denies any pain or discomfort  Procedural site dry and intact, no bleeding or swelling noted  Activity restriction and bedrest status reviewed  Pt transferred with family and belongings

## 2025-07-24 NOTE — H&P
[facility]  Cardiology Cath Lab H&P    Sophia Almanzar Patient Status:  Outpatient    10/25/1945 MRN [id number]   Location [facility] INTERVENTIONAL SUITES Attending Saeed Easton MD   Hosp Day # 0 PCP SOPHIA DICKERSON MD     Patient presents for outpatient angiogram.    79-year-old female presents for outpatient  PCI RCA, no change to cardiovascular symptoms.    Assessment  CAD: Flush occlusion ostial RCA, robust collaterals    Recommendations  Proceed with  PCI      Dany Barroso MD  Interventional Cardiology  [facility]    --------------------------------------------------------------------------------------------------------------------------------  ROS 10 systems reviewed, pertinent findings above.  ROS    History:  Past Medical History[1]  Past Surgical History[2]  Family History[3]   reports that she has quit smoking. She has never used smokeless tobacco. She reports current alcohol use. She reports that she does not use drugs.    Objective:        Intake/Output:   No intake or output data in the 24 hours ending 25 0715    Physical Exam:     General: Alert and oriented x 3. No apparent distress. No respiratory or constitutional distress.  HEENT: Normocephalic, anicteric sclera, neck supple.  Neck: No JVD, carotids 2+, no bruits.  Cardiac: Regular rate and rhythm. S1, S2 normal. No murmur, pericardial rub, S3.  Lungs: Clear without wheezes, rales, rhonchi or dullness.  Normal excursions and effort.  Abdomen: Soft, non-tender. BS-present.  Extremities: Without clubbing, cyanosis or edema.  Peripheral pulses are 2+.  Neurologic: Alert and oriented, normal affect.  Skin: Warm and dry.       Dany Barroso MD  2025  7:15 AM         [1]   Past Medical History:   Allergic rhinitis    molds, dust, weed, feather    Essential hypertension    Thyroid disease   [2]   Past Surgical History:  Procedure Laterality Date    Appendectomy  2009    Cholecystectomy       Tonsillectomy Left 03/1993   [3]   Family History  Problem Relation Age of Onset    Ear Problems Other     Cancer Other     Thyroid disease Other

## 2025-07-24 NOTE — H&P
[facility]    PATIENT'S NAME: BRENDA CADENA   ATTENDING PHYSICIAN: Saeed Easton MD   PATIENT ACCOUNT#:   [id number]    LOCATION:  [address] [place] [address]  MEDICAL RECORD #:   [id number]       YOB: 1945  ADMISSION DATE:       07/24/2025    HISTORY AND PHYSICAL EXAMINATION    CHIEF COMPLAINT:  Post coronary angiogram, failed  RCA intervention, and left groin pain.    HISTORY OF PRESENT ILLNESS:  Patient is a 79-year-old  female who was initially hospitalized in May 2025 with non-ST-elevation myocardial infarction.  At that time, cardiac angiogram showed chronic total occlusion of right coronary artery with left-to-right collaterals.  Also, echocardiogram showed severe aortic stenosis.  She was evaluated by Cardiology and scheduled today for above-mentioned procedure in an effort to prepare her for TAVR procedure.  Procedure was not successful to recanalize the  of right coronary artery disease.  Postprocedure, patient was complaining about left groin spasmic pain radiating down to her left leg.  She will be kept for observation overnight and pain control.      PAST MEDICAL HISTORY:  Coronary artery disease with RCA chronic total occlusion, asthma, reactive airway disease, moderate to severe aortic stenosis, hypertension, hyperlipidemia, kidney stones, restless legs syndrome, generalized osteoarthritis, degenerative joint disease of lumbar spine.    PAST SURGICAL HISTORY:  Left hemithyroidectomy, appendectomy, tonsillectomy, cholecystectomy.    MEDICATIONS:  Please see medication reconciliation list.     ALLERGIES:  ACE inhibitors, Naprosyn.  Able to tolerate aspirin.    FAMILY HISTORY:  Both parents had coronary artery disease.    SOCIAL HISTORY:  Ex-tobacco user.  Social alcohol.  No drug use.  Usually independent for basic activities of daily living.     REVIEW OF SYSTEMS:  Currently feels a little bit better.  No chest pain.  No shortness of breath.  Other 12-point review of  systems is negative.       PHYSICAL EXAMINATION:    GENERAL:  Alert and oriented to time, place and person.  No acute distress.   VITAL SIGNS:  Temperature 98.1, pulse 81, respiratory rate 20, blood pressure 172/62, pulse ox 94% on room air.  HEENT:  Atraumatic.  Oropharynx clear.  Moist mucous membranes.  Ears and nose normal.  Eyes:  Anicteric sclerae.   NECK:  Supple.  No lymphadenopathy.  Trachea midline.  Full range of motion.   LUNGS:  Clear to auscultation bilaterally.  Normal respiratory effort.   HEART:  Regular rate and rhythm.  S1 and S2 auscultated.  No murmur.    ABDOMEN:  Soft, nondistended.  No tenderness.  Positive bowel sounds.   EXTREMITIES:  No peripheral edema, clubbing or cyanosis.  Bilateral groin dressing.  No swelling or ecchymosis.   NEUROLOGIC:  Motor and sensory intact.    ASSESSMENT:    1.   Coronary artery disease with right coronary artery chronic total occlusion, failed chronic total occlusion intervention.   2.   Left groin pain, most likely related to muscle spasms.  No evidence of hematoma or ecchymosis.  Puncture wound without complications.    3.   Essential hypertension.  4.   Moderate to severe aortic stenosis, auscultated systolic murmur on exam.  5.   Hyperlipidemia.    PLAN:  Patient will be admitted to telemetry floor for observation.  Continue dual antiplatelet therapy.  Use oral Norco and oral Flexeril for pain control as needed.  Further workup for TAVR per Cardiology.    Dictated By Elza Yuen MD  d: 07/24/2025 18:10:56  t: 07/24/2025 18:26:20  Job [id number]  FB/

## 2025-07-25 VITALS
HEIGHT: 60 IN | DIASTOLIC BLOOD PRESSURE: 61 MMHG | WEIGHT: 135 LBS | OXYGEN SATURATION: 94 % | HEART RATE: 75 BPM | RESPIRATION RATE: 16 BRPM | BODY MASS INDEX: 26.5 KG/M2 | TEMPERATURE: 98 F | SYSTOLIC BLOOD PRESSURE: 150 MMHG

## 2025-07-25 LAB
ANION GAP SERPL CALC-SCNC: 7 MMOL/L (ref 0–18)
BUN BLD-MCNC: 10 MG/DL (ref 9–23)
BUN/CREAT SERPL: 15.4 (ref 10–20)
CALCIUM BLD-MCNC: 9.1 MG/DL (ref 8.7–10.4)
CHLORIDE SERPL-SCNC: 104 MMOL/L (ref 98–112)
CO2 SERPL-SCNC: 30 MMOL/L (ref 21–32)
CREAT BLD-MCNC: 0.65 MG/DL (ref 0.55–1.02)
DEPRECATED RDW RBC AUTO: 45.1 FL (ref 35.1–46.3)
EGFRCR SERPLBLD CKD-EPI 2021: 90 ML/MIN/1.73M2 (ref 60–?)
ERYTHROCYTE [DISTWIDTH] IN BLOOD BY AUTOMATED COUNT: 13.1 % (ref 11–15)
GLUCOSE BLD-MCNC: 146 MG/DL (ref 70–99)
HCT VFR BLD AUTO: 40.4 % (ref 35–48)
HGB BLD-MCNC: 12.8 G/DL (ref 12–16)
MCH RBC QN AUTO: 29.8 PG (ref 26–34)
MCHC RBC AUTO-ENTMCNC: 31.7 G/DL (ref 31–37)
MCV RBC AUTO: 94 FL (ref 80–100)
OSMOLALITY SERPL CALC.SUM OF ELEC: 294 MOSM/KG (ref 275–295)
PLATELET # BLD AUTO: 213 10(3)UL (ref 150–450)
POTASSIUM SERPL-SCNC: 3.7 MMOL/L (ref 3.5–5.1)
RBC # BLD AUTO: 4.3 X10(6)UL (ref 3.8–5.3)
SODIUM SERPL-SCNC: 141 MMOL/L (ref 136–145)
WBC # BLD AUTO: 6 X10(3) UL (ref 4–11)

## 2025-07-25 PROCEDURE — 99239 HOSP IP/OBS DSCHRG MGMT >30: CPT | Performed by: INTERNAL MEDICINE

## 2025-07-25 RX ORDER — CLOPIDOGREL BISULFATE 75 MG/1
75 TABLET ORAL DAILY
Status: DISCONTINUED | OUTPATIENT
Start: 2025-07-26 | End: 2025-07-25

## 2025-07-25 RX ORDER — ISOSORBIDE MONONITRATE 60 MG/1
60 TABLET, EXTENDED RELEASE ORAL DAILY
Qty: 30 TABLET | Refills: 0 | Status: SHIPPED | OUTPATIENT
Start: 2025-07-26

## 2025-07-25 RX ORDER — ECHINACEA PURPUREA EXTRACT 125 MG
1 TABLET ORAL
Status: DISCONTINUED | OUTPATIENT
Start: 2025-07-25 | End: 2025-07-25

## 2025-07-25 RX ORDER — ISOSORBIDE MONONITRATE 60 MG/1
60 TABLET, EXTENDED RELEASE ORAL DAILY
Status: DISCONTINUED | OUTPATIENT
Start: 2025-07-26 | End: 2025-07-25

## 2025-07-25 RX ORDER — ISOSORBIDE MONONITRATE 30 MG/1
30 TABLET, EXTENDED RELEASE ORAL ONCE
Status: COMPLETED | OUTPATIENT
Start: 2025-07-25 | End: 2025-07-25

## 2025-07-25 RX ORDER — ASPIRIN 81 MG/1
81 TABLET ORAL DAILY
Status: DISCONTINUED | OUTPATIENT
Start: 2025-07-25 | End: 2025-07-25

## 2025-07-25 RX ADMIN — METOPROLOL SUCCINATE 25 MG: 25 TABLET, EXTENDED RELEASE ORAL at 09:12:00

## 2025-07-25 RX ADMIN — ASPIRIN 81 MG: 81 TABLET ORAL at 12:24:00

## 2025-07-25 RX ADMIN — CLOPIDOGREL BISULFATE 525 MG: 75 TABLET ORAL at 09:12:00

## 2025-07-25 RX ADMIN — ISOSORBIDE MONONITRATE 30 MG: 30 TABLET, EXTENDED RELEASE ORAL at 12:24:00

## 2025-07-25 RX ADMIN — HEPARIN SODIUM 5000 UNITS: 5000 INJECTION, SOLUTION INTRAVENOUS; SUBCUTANEOUS at 09:12:00

## 2025-07-25 RX ADMIN — ISOSORBIDE MONONITRATE 30 MG: 30 TABLET, EXTENDED RELEASE ORAL at 09:12:00

## 2025-07-25 RX ADMIN — ESCITALOPRAM OXALATE 5 MG: 5 TABLET ORAL at 09:12:00

## 2025-07-25 NOTE — PROGRESS NOTES
Cardiology Progress Note    Sophia Almanzar Patient Status:  Observation    10/25/1945 MRN [id number]   Location [facility] 3W/SW Attending Devante Garcia MD   Hosp Day # 0 PCP SOPHIA DICKERSON MD     79-year-old female presents for outpatient  RCA, was unsuccessful in part due to LAD vasospasm, diffuse ST elevations in the setting of low-flow low gradient aortic stenosis.  Admitted for overnight observation.    Overnight no chest pain, ambulating today without issues.    Assessment:  CAD: Flush  occlusion of the right coronary artery with left-to-right collaterals, unable to find usable septal to the PDA, during procedure patient had vasospasm of the LAD, diffuse subendocardial ischemia with ST elevations therefore aborted procedure.    Aortic stenosis: Moderate to severe, peak velocity 3 m/s, dimensionless index 0.34, transthoracic echo with moderately restricted leaflet motion however not severely calcified.  She has a normal EF, small hypertrophied ventricle.      Plan:  Discharge home today  Will plan on outpatient TAJ and likely valve clinic evaluation  Can revisit  RCA depending on whether she receives a TAVR or not, will likely better tolerate retry with a TAVR valve however new valve in the aorta will complicate getting through the ostial RCA      Level of care: L3    Dany Barroso MD  Interventional Cardiology  [facility]    --------------------------------------------------------------------------------------------------------------------------------  ROS 12 systems reviewed, pertinent findings above.  ROS    History:  Past Medical History[1]  Past Surgical History[2]  Family History[3]   reports that she has quit smoking. She has never used smokeless tobacco. She reports current alcohol use. She reports that she does not use drugs.    Objective:   Temp: 98.5 °F (36.9 °C)  Pulse: 73  Resp: 18  BP: 199/73    Intake/Output:     Intake/Output Summary (Last 24  hours) at 7/25/2025 0843  Last data filed at 7/25/2025 0826  Gross per 24 hour   Intake 90 ml   Output 1000 ml   Net -910 ml       Physical Exam:    General: NAD  HEENT: Normocephalic, anicteric sclera, neck supple.  Neck: No JVD, carotids 2+, no bruits.  Cardiac: Regular rate and rhythm. S1, S2 normal. No murmur, pericardial rub, S3.  Lungs: Clear without wheezes, rales, rhonchi or dullness.  Normal excursions and effort.  Abdomen: Soft, non-tender. BS-present.  Extremities: Without clubbing, cyanosis or edema.  Peripheral pulses are 2+.  Neurologic: Non-focal  Skin: Warm and dry.       Dany Barroso MD  7/25/2025  8:43 AM         [1]   Past Medical History:   Allergic rhinitis    molds, dust, weed, feather    Essential hypertension    Thyroid disease   [2]   Past Surgical History:  Procedure Laterality Date    Appendectomy  09/2009    Cholecystectomy      Tonsillectomy Left 03/1993   [3]   Family History  Problem Relation Age of Onset    Ear Problems Other     Cancer Other     Thyroid disease Other

## 2025-07-25 NOTE — PROGRESS NOTES
Progress Note  Sophia Almanzar Patient Status:  Observation    10/25/1945 MRN [id number]   Location [facility] 3W/SW Attending Devante Garcia MD   Hosp Day # 0 PCP OSPHIA DICKERSON MD     Subjective:  Pt stated to have some chest discomfort that has been chronic, pt stated she usually doesn't get it at home since she started the imdur. Today she got the imdur late and might have triggered the pain. Rating as 2/10 now.     Objective:  BP (!) 184/62 (BP Location: Right arm)   Pulse 75   Temp 98.4 °F (36.9 °C) (Oral)   Resp 18   Ht 5' (1.524 m)   Wt 135 lb (61.2 kg)   LMP  (LMP Unknown)   SpO2 94%   BMI 26.37 kg/m²     Telemetry: NSR      Intake/Output:    Intake/Output Summary (Last 24 hours) at 2025 1141  Last data filed at 2025 0826  Gross per 24 hour   Intake 140 ml   Output 1000 ml   Net -860 ml       Last 3 Weights   25 0737 135 lb (61.2 kg)   25 0714 135 lb 6.4 oz (61.4 kg)   07/10/25 1450 137 lb (62.1 kg)   25 0527 138 lb 14.4 oz (63 kg)   25 0522 137 lb 6.4 oz (62.3 kg)   05/10/25 0500 138 lb (62.6 kg)   25 2316 139 lb 15.9 oz (63.5 kg)   25 1609 139 lb 15.9 oz (63.5 kg)   25 1335 147 lb (66.7 kg)   10/08/24 1139 140 lb (63.5 kg)       Labs:  Recent Labs   Lab 25  0946 25  0756   * 146*   BUN 17 10   CREATSERUM 0.62 0.65   EGFRCR 91 90   CA 9.5 9.1    141   K 4.5 3.7    104   CO2 28.0 30.0     Recent Labs   Lab 25  0946 25  0756   RBC 4.78 4.30   HGB 13.8 12.8   HCT 43.8 40.4   MCV 91.6 94.0   MCH 28.9 29.8   MCHC 31.5 31.7   RDW 13.0 13.1   NEPRELIM 2.95  --    WBC 5.2 6.0   .0 213.0         No results for input(s): \"TROP\", \"TROPHS\", \"CK\" in the last 168 hours.  No results found for: \"PT\", \"INR\"    Diagnostics:     Review of Systems   Respiratory: Negative.     Cardiovascular: Negative.          Physical Exam:    Physical Exam  Vitals reviewed.   Constitutional:       General: She is  not in acute distress.     Appearance: She is not ill-appearing.   Neck:      Vascular: No JVD.   Cardiovascular:      Rate and Rhythm: Normal rate and regular rhythm.      Pulses: Normal pulses.      Heart sounds: Murmur heard.   Pulmonary:      Effort: Pulmonary effort is normal. No respiratory distress.      Breath sounds: Normal breath sounds. No stridor. No wheezing, rhonchi or rales.   Chest:      Chest wall: No tenderness.   Musculoskeletal:      Cervical back: Normal range of motion.      Right lower leg: No edema.      Left lower leg: No edema.   Skin:     General: Skin is warm and dry.      Comments: Right groin site dressing d/I. Distal to incision site there is a small area with bruising    Neurological:      Mental Status: She is alert and oriented to person, place, and time.         Medications:  Scheduled Medications[1]  Medication Infusions[2]    Assessment/Plan:    CAD  - s/p LHC and Failed  PCI RCA 7/24/25. Unable to find septal retrograde to RCA. Stopped probing septal due to LAD vasospasm, ST elevations and hypotension in the setting of likely significant Aortic stenosis.   - recent NSTEMi s/p LHC 5/8/25 that showed mild to moderate disease of the left system.  Flextra coloration of the RCA with the well matured left to right collaterals  - on aspirin, plavix, isosorbide mononitrate, metoprolol 25mg daily    Severe aortic stenosis, low flow low gradient   - need TAVR work up as OP    HTN  - well controlled    HLD  - LDL 66 on statin    Prediabetic   - Hgb A1c of 5.9    Asthma    Plan;  - will titrate up the imdur to 60mg daily  - continue on aspirin, plavix, metoprolo 25mg  daily  - TAVR work up as OP with structural heart clinic at [facility]    Plan discussed with pt, Dr Barroso and RN   MATTHEW Cho  [facility]  7/25/2025  11:41 AM         [1]    aspirin  324 mg Oral Once    clopidogrel  525 mg Oral Daily    escitalopram  5 mg Oral Daily    isosorbide  mononitrate ER  30 mg Oral Daily    metoprolol succinate ER  25 mg Oral BID    rOPINIRole HCl  3 mg Oral Nightly    rosuvastatin  20 mg Oral Nightly    heparin  5,000 Units Subcutaneous 2 times per day   [2]

## 2025-07-25 NOTE — PLAN OF CARE
Patient alert and oriented x 4 on room air. Denies pain at this time. Patient to discharge home per MD orders. AVS completed and discussed with patient, there were no further questions. Follow up appointments and medications discussed. IV and tele removed. No belongings left behind. Pt's cousin to transport home.     Problem: Patient Centered Care  Goal: Patient preferences are identified and integrated in the patient's plan of care  Description: Interventions:  - What would you like us to know as we care for you? I live at home alone  - Provide timely, complete, and accurate information to patient/family  - Incorporate patient and family knowledge, values, beliefs, and cultural backgrounds into the planning and delivery of care  - Encourage patient/family to participate in care and decision-making at the level they choose  - Honor patient and family perspectives and choices  Outcome: Adequate for Discharge     Problem: Patient/Family Goals  Goal: Patient/Family Long Term Goal  Description: Patient's Long Term Goal: to go home    Interventions:  - tavr  - See additional Care Plan goals for specific interventions  Outcome: Adequate for Discharge  Goal: Patient/Family Short Term Goal  Description: Patient's Short Term Goal: to feel better     Interventions:   - tavr  - See additional Care Plan goals for specific interventions  Outcome: Adequate for Discharge     Problem: CARDIOVASCULAR - ADULT  Goal: Maintains optimal cardiac output and hemodynamic stability  Description: INTERVENTIONS:  - Monitor vital signs, rhythm, and trends  - Monitor for bleeding, hypotension and signs of decreased cardiac output  - Evaluate effectiveness of vasoactive medications to optimize hemodynamic stability  - Monitor arterial and/or venous puncture sites for bleeding and/or hematoma  - Assess quality of pulses, skin color and temperature  - Assess for signs of decreased coronary artery perfusion - ex. Angina  - Evaluate fluid balance,  assess for edema, trend weights  Outcome: Adequate for Discharge  Goal: Absence of cardiac arrhythmias or at baseline  Description: INTERVENTIONS:  - Continuous cardiac monitoring, monitor vital signs, obtain 12 lead EKG if indicated  - Evaluate effectiveness of antiarrhythmic and heart rate control medications as ordered  - Initiate emergency measures for life threatening arrhythmias  - Monitor electrolytes and administer replacement therapy as ordered  Outcome: Adequate for Discharge

## 2025-07-25 NOTE — DISCHARGE SUMMARY
[facility]  part of [facility]    Discharge Summary    Sophia Almanzar Patient Status:  Observation    10/25/1945 MRN [id number]   Location [facility] 3W/SW Attending Devante Garcia MD   Hosp Day # 0 PCP SOPHIA DICKERSON MD     Date of Admission: 2025     Date of Discharge: 25      Lace+ Score: 60  59-90 High Risk  29-58 Medium Risk  0-28   Low Risk.    TCM Follow-Up Recommendation:  LACE > 58: High Risk of readmission after discharge from the hospital.    DISCHARGE DX: Active Problems:    CAD (coronary artery disease)       The patient was seen and examined on day of discharge and this discharge summary is in conjunction with any daily progress note from day of discharge.    HPI per admitting physician: \"Patient is a 79-year-old  female who was initially hospitalized in May 2025 with non-ST-elevation myocardial infarction. At that time, cardiac angiogram showed chronic total occlusion of right coronary artery with left-to-right collaterals. Also, echocardiogram showed severe aortic stenosis. She was evaluated by Cardiology and scheduled today for above-mentioned procedure in an effort to prepare her for TAVR procedure. Procedure was not successful to recanalize the  of right coronary artery disease. Postprocedure, patient was complaining about left groin spasmic pain radiating down to her left leg. She will be kept for observation overnight and pain control. \"    Hospital Course:     CAD  - s/p LHC and Failed  PCI RCA 25. Unable to find septal retrograde to RCA. Stopped probing septal due to LAD vasospasm, ST elevations and hypotension in the setting of likely significant Aortic stenosis.   - recent NSTEMi s/p LHC 25 that showed mild to moderate disease of the left system.  Flextra coloration of the RCA with the well matured left to right collaterals  - on aspirin, plavix, isosorbide mononitrate (increased to 60mg daily), metoprolol      Severe  aortic stenosis, low flow low gradient   - F/u with cardiology for TAVR work up as OP     HTN  - CPM     HLD  - LDL 66 on statin     Prediabetic   - Hgb A1c of 5.9     Asthma        Physical Exam:    Vitals:    07/25/25 0600 07/25/25 0606 07/25/25 0909 07/25/25 1222   BP: (!) 199/73  (!) 184/62 (!) 165/57   BP Location: Right arm  Right arm Right arm   Pulse: 73  75 76   Resp: 18  18    Temp:  98.5 °F (36.9 °C) 98.4 °F (36.9 °C)    TempSrc:  Oral Oral    SpO2: 95%  94%    Weight:       Height:         Patient Weight for the past 72 hrs:   Weight   07/24/25 0714 135 lb 6.4 oz (61.4 kg)   07/24/25 0737 135 lb (61.2 kg)       Intake/Output Summary (Last 24 hours) at 7/25/2025 1314  Last data filed at 7/25/2025 0826  Gross per 24 hour   Intake 140 ml   Output 1000 ml   Net -860 ml         GENERAL:  Awake and alert, in no acute distress.  HEART:  S1 and S2 heard.  RRR +ESM  LUNGS:  Air entry was good.  No crackles or wheezes   ABDOMEN: Soft and non-tender. PSYCHIATRIC: Normal mood    CULTURE:   No results found for this visit on 07/24/25.    IMAGING STUDIES: SOME MAY NEED FOLLOW UP WITH PCP   XR CHEST PA + LAT CHEST (YMF=[id number])  Result Date: 7/21/2025  CONCLUSION: No acute cardiopulmonary abnormality. Electronically Verified and Signed by Attending Radiologist: Heriberto Klein MD 7/21/2025 10:22 AM Workstation: [id number]      LABS :     Lab Results   Component Value Date    WBC 6.0 07/25/2025    HGB 12.8 07/25/2025    HCT 40.4 07/25/2025    .0 07/25/2025    CREATSERUM 0.65 07/25/2025    BUN 10 07/25/2025     07/25/2025    K 3.7 07/25/2025     07/25/2025    CO2 30.0 07/25/2025     (H) 07/25/2025    CA 9.1 07/25/2025    ALB 4.1 05/10/2025    ALKPHO 68 05/10/2025    BILT 0.5 05/10/2025    TP 6.1 05/10/2025    AST 42 (H) 05/10/2025    ALT 50 (H) 05/10/2025    PTT 25.8 05/11/2025    DDIMER 0.62 05/08/2025    MG 2.1 05/12/2025       Recent Labs   Lab 07/21/25  0946 07/25/25  0756   RBC 4.78 4.30    HGB 13.8 12.8   HCT 43.8 40.4   MCV 91.6 94.0   MCH 28.9 29.8   MCHC 31.5 31.7   RDW 13.0 13.1   NEPRELIM 2.95  --    WBC 5.2 6.0   .0 213.0     Recent Labs   Lab 07/21/25  0946 07/25/25  0756   * 146*   BUN 17 10   CREATSERUM 0.62 0.65   CA 9.5 9.1    141   K 4.5 3.7    104   CO2 28.0 30.0     No results found for: \"PT\", \"INR\"    Disposition: Discharge to Home    Condition at Discharge: Stable     Discharge Medications:      Discharge Medications        CHANGE how you take these medications        Instructions Prescription details   isosorbide mononitrate ER 60 MG Tb24  Commonly known as: Imdur  Start taking on: July 26, 2025  What changed:   medication strength  how much to take      Take 1 tablet (60 mg total) by mouth daily.   Quantity: 30 tablet  Refills: 0            CONTINUE taking these medications        Instructions Prescription details   aspirin 81 MG Tbec      Take 1 tablet (81 mg total) by mouth daily.   Quantity: 30 tablet  Refills: 0     clopidogrel 75 MG Tabs  Commonly known as: Plavix      Take 1 tablet (75 mg total) by mouth daily.   Quantity: 30 tablet  Refills: 0     escitalopram 5 MG Tabs  Commonly known as: Lexapro      Take 1 tablet (5 mg total) by mouth in the morning.   Refills: 0     latanoprost 0.005 % Soln  Commonly known as: Xalatan       Refills: 0     metoprolol succinate ER 25 MG Tb24  Commonly known as: Toprol XL      Take 1 tablet (25 mg total) by mouth in the morning and 1 tablet (25 mg total) before bedtime.   Refills: 0     rOPINIRole HCl 3 MG Tabs  Commonly known as: REQUIP      Take 1 tablet (3 mg total) by mouth nightly.   Refills: 0     rosuvastatin 20 MG Tabs  Commonly known as: Crestor      Take 1 tablet (20 mg total) by mouth nightly.   Refills: 0               Where to Get Your Medications        These medications were sent to [id number] [place], [place] - [address] [place] [address] AT SEC OF [facility], [phone],  [phone]  [address] [facility] [address]      Phone: [phone]   isosorbide mononitrate ER 60 MG Tb24         Follow up Visits  Zayra Brantley, APRN  [address]    [facility] [id number] [phone]    Follow up on 7/29/2025  At 1pm    SOPHIA DICKERSON MD    Consultants         Provider   Role Specialty     Devante Garcia MD      Consulting Physician HOSPITALIST     Elza Yuen MD      Consulting Physician HOSPITALIST              Other Discharge Instructions:       Discharge Instructions         HOME CARE INSTRUCTIONS FOLLOWING CORONARY ANGIOGRAPHY (LEFT HEART CATHETERIZATION)    Activity  DO NOT drive after the procedure.  You may resume driving late the following day according to the nurse or physician's instructions  Plan on resting and relaxing tonight and tomorrow  Avoid drinking alcohol for the next 24 hours  Resume your normal activity after 48 hours, or as instructed by your physician  Do not lift anything over 10 pounds for 1 week  Avoid repeated stair use and excessive walking for the next 24 hours  Avoid repetitive squatting or bending exercises/motions for 1 week.     What is Normal?  A small lump at the procedure site associated with mild tenderness when touched  The procedure site may be bruised or discolored  There may be a small amount of drainage on the bandage    Special Instructions   Drink plenty of fluids during the next 24 hours to \"flush\" the contrast from your system  Keep the bandage clean and dry  After 24 hours, remove the bandage.  Remove the dressing anytime tomorrow after __0000__.  You may shower after removal of the bandage, wash the procedure site gently with soap and water  If you choose to wear a bandage for a few days, make sure it remains clean and dry and that it is changed daily  Do NOT apply any other powders, ointments or creams to the site for 1 week.  DO NOT submerge the procedure site for 1 week (no bath tubs or  pools)    Additional Instructions:  Do not take glucophage/metformin containing products for at least 48 hours after procedure, unless otherwise directed by your physician.    When you should NOTIFY YOUR PHYSICIAN  Bleeding can occur at the procedure site - both on the outside of the skin and/or beneath the surface of the skin  Swelling or a large lump at the procedure site can occur, which may be accompanied by moderate to severe pain    If either of the above occurs, lie down flat.  Have someone apply pressure to the procedure site with both hands, as instructed by the nurse.  Hold pressure for 20 minutes and the bleeding should stop.  Notify your physician of the occurrence  If the bleeding does not stop, call 911 and continue to apply pressure    If you experience signs of a fever, temperature > 101°, chills, infection (redness, swelling, thick yellow drainage, or a foul odor from the procedure site)  If you notice any numbness, tingling, or loss of feeling to your leg or foot or groin access    Other  You may resume your present diet, unless otherwise specified by your physician.  You may resume all of your medications as prescribed, unless otherwise directed by your physician.  A list of your medications was provided to you at discharge.  Gradually resume your previous aerobic exercise schedule as tolerated.    Additional Instructions:  Your physician used a Closure device to close the incision site:  Type of closure used: Perclose    See appropriate pamphlet information, if applicable  You may feel a \"pea or olive pit\" sized lump in the groin area  The collagen will be absorbed (broken down) by your body in approximately 6-12 weeks.    *If you have any question or concern, please call the on-call nurse at [phone]        ----------------------------------------------------  33 MIN SPENT ON THIS DC   Devante Garcia MD    7/25/2025

## 2025-07-26 LAB
ATRIAL RATE: 75 BPM
P AXIS: 65 DEGREES
P-R INTERVAL: 148 MS
Q-T INTERVAL: 398 MS
QRS DURATION: 88 MS
QTC CALCULATION (BEZET): 444 MS
R AXIS: 61 DEGREES
T AXIS: 95 DEGREES
VENTRICULAR RATE: 75 BPM

## 2025-07-27 PROBLEM — I25.82 CHRONIC TOTAL OCCLUSION OF CORONARY ARTERY: Status: ACTIVE | Noted: 2025-07-27

## 2025-08-05 ENCOUNTER — HOSPITAL ENCOUNTER (OUTPATIENT)
Dept: INTERVENTIONAL RADIOLOGY/VASCULAR | Facility: HOSPITAL | Age: 80
Discharge: HOME | End: 2025-08-05
Attending: INTERNAL MEDICINE | Admitting: STUDENT IN AN ORGANIZED HEALTH CARE EDUCATION/TRAINING PROGRAM

## 2025-08-05 VITALS
RESPIRATION RATE: 20 BRPM | HEART RATE: 67 BPM | WEIGHT: 138 LBS | TEMPERATURE: 97 F | DIASTOLIC BLOOD PRESSURE: 49 MMHG | HEIGHT: 60 IN | OXYGEN SATURATION: 93 % | SYSTOLIC BLOOD PRESSURE: 137 MMHG | BODY MASS INDEX: 27.09 KG/M2

## 2025-08-05 DIAGNOSIS — I35.0 AORTIC STENOSIS: ICD-10-CM

## 2025-08-05 PROCEDURE — 93312 ECHO TRANSESOPHAGEAL: CPT | Performed by: STUDENT IN AN ORGANIZED HEALTH CARE EDUCATION/TRAINING PROGRAM

## 2025-08-05 RX ORDER — MIDAZOLAM HYDROCHLORIDE 1 MG/ML
INJECTION INTRAMUSCULAR; INTRAVENOUS
Status: COMPLETED
Start: 2025-08-05 | End: 2025-08-05

## 2025-08-05 RX ORDER — SODIUM CHLORIDE 9 MG/ML
INJECTION, SOLUTION INTRAVENOUS
Status: COMPLETED | OUTPATIENT
Start: 2025-08-05 | End: 2025-08-05

## 2025-08-05 RX ORDER — MIDAZOLAM HYDROCHLORIDE 1 MG/ML
3 INJECTION INTRAMUSCULAR; INTRAVENOUS ONCE
Status: COMPLETED | OUTPATIENT
Start: 2025-08-05 | End: 2025-08-05

## 2025-08-05 RX ORDER — SODIUM CHLORIDE 0.9 % (FLUSH) 0.9 %
10 SYRINGE (ML) INJECTION AS NEEDED
Status: DISCONTINUED | OUTPATIENT
Start: 2025-08-05 | End: 2025-08-05

## 2025-08-05 RX ADMIN — MIDAZOLAM HYDROCHLORIDE 3 MG: 1 INJECTION INTRAMUSCULAR; INTRAVENOUS at 12:08:00

## 2025-08-05 RX ADMIN — SODIUM CHLORIDE: 9 INJECTION, SOLUTION INTRAVENOUS at 11:00:00

## 2025-08-15 ENCOUNTER — ANESTHESIA (OUTPATIENT)
Dept: INTERVENTIONAL RADIOLOGY/VASCULAR | Facility: HOSPITAL | Age: 80
End: 2025-08-15

## 2025-08-15 ENCOUNTER — HOSPITAL ENCOUNTER (OUTPATIENT)
Dept: INTERVENTIONAL RADIOLOGY/VASCULAR | Facility: HOSPITAL | Age: 80
Discharge: HOME | End: 2025-08-15
Attending: STUDENT IN AN ORGANIZED HEALTH CARE EDUCATION/TRAINING PROGRAM | Admitting: STUDENT IN AN ORGANIZED HEALTH CARE EDUCATION/TRAINING PROGRAM

## 2025-08-15 ENCOUNTER — ANESTHESIA EVENT (OUTPATIENT)
Dept: INTERVENTIONAL RADIOLOGY/VASCULAR | Facility: HOSPITAL | Age: 80
End: 2025-08-15

## 2025-08-15 ENCOUNTER — HOSPITAL ENCOUNTER (OUTPATIENT)
Dept: CV DIAGNOSTICS | Facility: HOSPITAL | Age: 80
Discharge: HOME | End: 2025-08-15
Attending: STUDENT IN AN ORGANIZED HEALTH CARE EDUCATION/TRAINING PROGRAM

## 2025-08-15 VITALS
DIASTOLIC BLOOD PRESSURE: 59 MMHG | OXYGEN SATURATION: 94 % | SYSTOLIC BLOOD PRESSURE: 95 MMHG | BODY MASS INDEX: 26.31 KG/M2 | RESPIRATION RATE: 20 BRPM | HEART RATE: 74 BPM | TEMPERATURE: 97 F | WEIGHT: 134 LBS | HEIGHT: 60 IN

## 2025-08-15 DIAGNOSIS — I35.0 AORTIC STENOSIS: ICD-10-CM

## 2025-08-15 PROCEDURE — 93320 DOPPLER ECHO COMPLETE: CPT | Performed by: STUDENT IN AN ORGANIZED HEALTH CARE EDUCATION/TRAINING PROGRAM

## 2025-08-15 PROCEDURE — 36415 COLL VENOUS BLD VENIPUNCTURE: CPT

## 2025-08-15 PROCEDURE — 93312 ECHO TRANSESOPHAGEAL: CPT | Performed by: STUDENT IN AN ORGANIZED HEALTH CARE EDUCATION/TRAINING PROGRAM

## 2025-08-15 PROCEDURE — 93325 DOPPLER ECHO COLOR FLOW MAPG: CPT | Performed by: STUDENT IN AN ORGANIZED HEALTH CARE EDUCATION/TRAINING PROGRAM

## 2025-08-15 RX ORDER — SODIUM CHLORIDE 0.9 % (FLUSH) 0.9 %
10 SYRINGE (ML) INJECTION AS NEEDED
Status: DISCONTINUED | OUTPATIENT
Start: 2025-08-15 | End: 2025-08-15

## 2025-08-15 RX ORDER — MORPHINE SULFATE 4 MG/ML
4 INJECTION, SOLUTION INTRAMUSCULAR; INTRAVENOUS EVERY 10 MIN PRN
Status: DISCONTINUED | OUTPATIENT
Start: 2025-08-15 | End: 2025-08-15

## 2025-08-15 RX ORDER — EPHEDRINE SULFATE 50 MG/ML
INJECTION INTRAVENOUS AS NEEDED
Status: DISCONTINUED | OUTPATIENT
Start: 2025-08-15 | End: 2025-08-15 | Stop reason: SURG

## 2025-08-15 RX ORDER — HYDROMORPHONE HYDROCHLORIDE 1 MG/ML
0.2 INJECTION, SOLUTION INTRAMUSCULAR; INTRAVENOUS; SUBCUTANEOUS EVERY 5 MIN PRN
Status: DISCONTINUED | OUTPATIENT
Start: 2025-08-15 | End: 2025-08-15

## 2025-08-15 RX ORDER — HYDROMORPHONE HYDROCHLORIDE 1 MG/ML
0.6 INJECTION, SOLUTION INTRAMUSCULAR; INTRAVENOUS; SUBCUTANEOUS EVERY 5 MIN PRN
Status: DISCONTINUED | OUTPATIENT
Start: 2025-08-15 | End: 2025-08-15

## 2025-08-15 RX ORDER — MORPHINE SULFATE 10 MG/ML
6 INJECTION, SOLUTION INTRAMUSCULAR; INTRAVENOUS EVERY 10 MIN PRN
Status: DISCONTINUED | OUTPATIENT
Start: 2025-08-15 | End: 2025-08-15

## 2025-08-15 RX ORDER — HYDROMORPHONE HYDROCHLORIDE 1 MG/ML
0.4 INJECTION, SOLUTION INTRAMUSCULAR; INTRAVENOUS; SUBCUTANEOUS EVERY 5 MIN PRN
Status: DISCONTINUED | OUTPATIENT
Start: 2025-08-15 | End: 2025-08-15

## 2025-08-15 RX ORDER — MORPHINE SULFATE 2 MG/ML
2 INJECTION, SOLUTION INTRAMUSCULAR; INTRAVENOUS EVERY 10 MIN PRN
Status: DISCONTINUED | OUTPATIENT
Start: 2025-08-15 | End: 2025-08-15

## 2025-08-15 RX ORDER — NALOXONE HYDROCHLORIDE 0.4 MG/ML
80 INJECTION, SOLUTION INTRAMUSCULAR; INTRAVENOUS; SUBCUTANEOUS AS NEEDED
Status: DISCONTINUED | OUTPATIENT
Start: 2025-08-15 | End: 2025-08-15

## 2025-08-15 RX ORDER — SODIUM CHLORIDE, SODIUM LACTATE, POTASSIUM CHLORIDE, CALCIUM CHLORIDE 600; 310; 30; 20 MG/100ML; MG/100ML; MG/100ML; MG/100ML
INJECTION, SOLUTION INTRAVENOUS CONTINUOUS PRN
Status: DISCONTINUED | OUTPATIENT
Start: 2025-08-15 | End: 2025-08-15 | Stop reason: SURG

## 2025-08-15 RX ORDER — SODIUM CHLORIDE, SODIUM LACTATE, POTASSIUM CHLORIDE, CALCIUM CHLORIDE 600; 310; 30; 20 MG/100ML; MG/100ML; MG/100ML; MG/100ML
INJECTION, SOLUTION INTRAVENOUS CONTINUOUS
Status: DISCONTINUED | OUTPATIENT
Start: 2025-08-15 | End: 2025-08-15

## 2025-08-15 RX ORDER — SODIUM CHLORIDE 9 MG/ML
INJECTION, SOLUTION INTRAVENOUS
Status: COMPLETED | OUTPATIENT
Start: 2025-08-15 | End: 2025-08-15

## 2025-08-15 RX ADMIN — SODIUM CHLORIDE: 9 INJECTION, SOLUTION INTRAVENOUS at 10:00:00

## 2025-08-15 RX ADMIN — EPHEDRINE SULFATE 5 MG: 50 INJECTION INTRAVENOUS at 12:02:00

## 2025-08-15 RX ADMIN — SODIUM CHLORIDE, SODIUM LACTATE, POTASSIUM CHLORIDE, CALCIUM CHLORIDE: 600; 310; 30; 20 INJECTION, SOLUTION INTRAVENOUS at 11:34:00

## 2025-08-18 ENCOUNTER — TELEPHONE (OUTPATIENT)
Dept: CARDIOLOGY CLINIC | Facility: HOSPITAL | Age: 80
End: 2025-08-18

## (undated) NOTE — LETTER
Hospital Discharge Documentation      From: [facility] Hospitalist's Office  Phone: [phone]    Patient discharged time/date: 2025  6:40 PM  Patient discharge disposition:  Home or Self Care       Discharge Summary - D/C Summary        Discharge Summary signed by Devante Garcia MD at 2025  8:54 AM  Version 1 of 1      Author: Devante Garcia MD Service: Internal Medicine Author Type: Physician    Filed: 2025  8:54 AM Date of Service: 2025  1:14 PM Status: Signed    : Devante Garcia MD (Physician)         [facility]  part of [facility]    Discharge Summary    Sophia Almanzar Patient Status:  Observation    10/25/1945 MRN [id number]   Location [facility] 3W/SW Attending Devante Garcia MD   Hosp Day # 0 PCP SOPHIA DICKERSON MD     Date of Admission: 2025     Date of Discharge: 25      Lace+ Score: 60  59-90 High Risk  29-58 Medium Risk  0-28   Low Risk.    TCM Follow-Up Recommendation:  LACE > 58: High Risk of readmission after discharge from the hospital.    DISCHARGE DX: Active Problems:    CAD (coronary artery disease)       The patient was seen and examined on day of discharge and this discharge summary is in conjunction with any daily progress note from day of discharge.    HPI per admitting physician: \"Patient is a 79-year-old  female who was initially hospitalized in May 2025 with non-ST-elevation myocardial infarction. At that time, cardiac angiogram showed chronic total occlusion of right coronary artery with left-to-right collaterals. Also, echocardiogram showed severe aortic stenosis. She was evaluated by Cardiology and scheduled today for above-mentioned procedure in an effort to prepare her for TAVR procedure. Procedure was not successful to recanalize the  of right coronary artery disease. Postprocedure, patient was complaining about left groin spasmic pain radiating down to her left leg. She will be  kept for observation overnight and pain control. \"    Hospital Course:     CAD  - s/p LHC and Failed  PCI RCA 7/24/25. Unable to find septal retrograde to RCA. Stopped probing septal due to LAD vasospasm, ST elevations and hypotension in the setting of likely significant Aortic stenosis.   - recent NSTEMi s/p LHC 5/8/25 that showed mild to moderate disease of the left system.  Flextra coloration of the RCA with the well matured left to right collaterals  - on aspirin, plavix, isosorbide mononitrate (increased to 60mg daily), metoprolol      Severe aortic stenosis, low flow low gradient   - F/u with cardiology for TAVR work up as OP     HTN  - CPM     HLD  - LDL 66 on statin     Prediabetic   - Hgb A1c of 5.9     Asthma        Physical Exam:    Vitals:    07/25/25 0600 07/25/25 0606 07/25/25 0909 07/25/25 1222   BP: (!) 199/73  (!) 184/62 (!) 165/57   BP Location: Right arm  Right arm Right arm   Pulse: 73  75 76   Resp: 18  18    Temp:  98.5 °F (36.9 °C) 98.4 °F (36.9 °C)    TempSrc:  Oral Oral    SpO2: 95%  94%    Weight:       Height:         Patient Weight for the past 72 hrs:   Weight   07/24/25 0714 135 lb 6.4 oz (61.4 kg)   07/24/25 0737 135 lb (61.2 kg)       Intake/Output Summary (Last 24 hours) at 7/25/2025 1314  Last data filed at 7/25/2025 0826  Gross per 24 hour   Intake 140 ml   Output 1000 ml   Net -860 ml         GENERAL:  Awake and alert, in no acute distress.  HEART:  S1 and S2 heard.  RRR +ESM  LUNGS:  Air entry was good.  No crackles or wheezes   ABDOMEN: Soft and non-tender. PSYCHIATRIC: Normal mood    CULTURE:   No results found for this visit on 07/24/25.    IMAGING STUDIES: SOME MAY NEED FOLLOW UP WITH PCP   XR CHEST PA + LAT CHEST (VIB=[id number])  Result Date: 7/21/2025  CONCLUSION: No acute cardiopulmonary abnormality. Electronically Verified and Signed by Attending Radiologist: Heriberto Klein MD 7/21/2025 10:22 AM Workstation: [id number]      LABS :     Lab Results   Component Value Date     WBC 6.0 07/25/2025    HGB 12.8 07/25/2025    HCT 40.4 07/25/2025    .0 07/25/2025    CREATSERUM 0.65 07/25/2025    BUN 10 07/25/2025     07/25/2025    K 3.7 07/25/2025     07/25/2025    CO2 30.0 07/25/2025     (H) 07/25/2025    CA 9.1 07/25/2025    ALB 4.1 05/10/2025    ALKPHO 68 05/10/2025    BILT 0.5 05/10/2025    TP 6.1 05/10/2025    AST 42 (H) 05/10/2025    ALT 50 (H) 05/10/2025    PTT 25.8 05/11/2025    DDIMER 0.62 05/08/2025    MG 2.1 05/12/2025       Recent Labs   Lab 07/21/25  0946 07/25/25  0756   RBC 4.78 4.30   HGB 13.8 12.8   HCT 43.8 40.4   MCV 91.6 94.0   MCH 28.9 29.8   MCHC 31.5 31.7   RDW 13.0 13.1   NEPRELIM 2.95  --    WBC 5.2 6.0   .0 213.0     Recent Labs   Lab 07/21/25  0946 07/25/25  0756   * 146*   BUN 17 10   CREATSERUM 0.62 0.65   CA 9.5 9.1    141   K 4.5 3.7    104   CO2 28.0 30.0     No results found for: \"PT\", \"INR\"    Disposition: Discharge to Home    Condition at Discharge: Stable     Discharge Medications:      Discharge Medications        CHANGE how you take these medications        Instructions Prescription details   isosorbide mononitrate ER 60 MG Tb24  Commonly known as: Imdur  Start taking on: July 26, 2025  What changed:   medication strength  how much to take      Take 1 tablet (60 mg total) by mouth daily.   Quantity: 30 tablet  Refills: 0            CONTINUE taking these medications        Instructions Prescription details   aspirin 81 MG Tbec      Take 1 tablet (81 mg total) by mouth daily.   Quantity: 30 tablet  Refills: 0     clopidogrel 75 MG Tabs  Commonly known as: Plavix      Take 1 tablet (75 mg total) by mouth daily.   Quantity: 30 tablet  Refills: 0     escitalopram 5 MG Tabs  Commonly known as: Lexapro      Take 1 tablet (5 mg total) by mouth in the morning.   Refills: 0     latanoprost 0.005 % Soln  Commonly known as: Xalatan       Refills: 0     metoprolol succinate ER 25 MG Tb24  Commonly known as: Toprol  XL      Take 1 tablet (25 mg total) by mouth in the morning and 1 tablet (25 mg total) before bedtime.   Refills: 0     rOPINIRole HCl 3 MG Tabs  Commonly known as: REQUIP      Take 1 tablet (3 mg total) by mouth nightly.   Refills: 0     rosuvastatin 20 MG Tabs  Commonly known as: Crestor      Take 1 tablet (20 mg total) by mouth nightly.   Refills: 0               Where to Get Your Medications        These medications were sent to [id number] - [place], [place] - [address] AT SEC OF [address] [address] [facility], [phone], [phone]  [address] [address]      Phone: [phone]   isosorbide mononitrate ER 60 MG Tb24         Follow up Visits  Zayra Brantley, APRN  [address]    [facility] [id number] [phone]    Follow up on 7/29/2025  At 1pm    SOPHIA DICKERSON MD    Consultants         Provider   Role Specialty     Devante Garcia MD      Consulting Physician HOSPITALIST     Elza Yuen MD      Consulting Physician HOSPITALIST              Other Discharge Instructions:       Discharge Instructions         HOME CARE INSTRUCTIONS FOLLOWING CORONARY ANGIOGRAPHY (LEFT HEART CATHETERIZATION)    Activity  DO NOT drive after the procedure.  You may resume driving late the following day according to the nurse or physician's instructions  Plan on resting and relaxing tonight and tomorrow  Avoid drinking alcohol for the next 24 hours  Resume your normal activity after 48 hours, or as instructed by your physician  Do not lift anything over 10 pounds for 1 week  Avoid repeated stair use and excessive walking for the next 24 hours  Avoid repetitive squatting or bending exercises/motions for 1 week.     What is Normal?  A small lump at the procedure site associated with mild tenderness when touched  The procedure site may be bruised or discolored  There may be a small amount of drainage on the bandage    Special Instructions   Drink plenty of fluids during  the next 24 hours to \"flush\" the contrast from your system  Keep the bandage clean and dry  After 24 hours, remove the bandage.  Remove the dressing anytime tomorrow after __0000__.  You may shower after removal of the bandage, wash the procedure site gently with soap and water  If you choose to wear a bandage for a few days, make sure it remains clean and dry and that it is changed daily  Do NOT apply any other powders, ointments or creams to the site for 1 week.  DO NOT submerge the procedure site for 1 week (no bath tubs or pools)    Additional Instructions:  Do not take glucophage/metformin containing products for at least 48 hours after procedure, unless otherwise directed by your physician.    When you should NOTIFY YOUR PHYSICIAN  Bleeding can occur at the procedure site - both on the outside of the skin and/or beneath the surface of the skin  Swelling or a large lump at the procedure site can occur, which may be accompanied by moderate to severe pain    If either of the above occurs, lie down flat.  Have someone apply pressure to the procedure site with both hands, as instructed by the nurse.  Hold pressure for 20 minutes and the bleeding should stop.  Notify your physician of the occurrence  If the bleeding does not stop, call 911 and continue to apply pressure    If you experience signs of a fever, temperature > 101°, chills, infection (redness, swelling, thick yellow drainage, or a foul odor from the procedure site)  If you notice any numbness, tingling, or loss of feeling to your leg or foot or groin access    Other  You may resume your present diet, unless otherwise specified by your physician.  You may resume all of your medications as prescribed, unless otherwise directed by your physician.  A list of your medications was provided to you at discharge.  Gradually resume your previous aerobic exercise schedule as tolerated.    Additional Instructions:  Your physician used a Closure device to close the  incision site:  Type of closure used: Perclose    See appropriate pamphlet information, if applicable  You may feel a \"pea or olive pit\" sized lump in the groin area  The collagen will be absorbed (broken down) by your body in approximately 6-12 weeks.    *If you have any question or concern, please call the on-call nurse at [phone]        ----------------------------------------------------  33 MIN SPENT ON THIS DC   Devante Garcia MD    7/25/2025      Electronically signed by Devante Garcia MD on 7/27/2025  8:54 AM

## (undated) NOTE — LETTER
[place] [address]  Authorization for Invasive Procedures  Date: 05/08/2025           Time: 1643    I hereby authorize Dr. Easton, my physician and his/her assistants (if applicable), which may include medical students, residents, and/or fellows, to perform the following surgical operation/ procedure and administer such anesthesia as may be determined necessary by my physician:  Operation/Procedure name (s)  Cardiac Catheterization, Left Ventricular Cineangiography, Bilateral Selective Coronary Angiography and/or Right Heart Catheterization; possible Percutaneous Transluminal Coronary Angioplasty, Coronary Atherectomy, Coronary Stent, Intracoronary Thrombolytic therapy, Antiplatelet therapy and/or Intravascular Ultrasound on Sophia Almanzar   2.   I recognize that during the surgical operation/procedure, unforeseen conditions may necessitate additional or different procedures than those listed above.  I, therefore, further authorize and request that the above-named surgeon, assistants, or designees perform such procedures as are, in their judgment, necessary and desirable.    3.   My surgeon/physician has discussed prior to my surgery the potential benefits, risks and side effects of this procedure; the likelihood of achieving goals; and potential problems that might occur during recuperation.  They also discussed reasonable alternatives to the procedure, including risks, benefits, and side effects related to the alternatives and risks related to not receiving this procedure.  I have had all my questions answered and I acknowledge that no guarantee has been made as to the result that may be obtained.    4.   Should the need arise during my operation/procedure, which includes change of level of care prior to discharge, I also consent to the administration of blood and/or blood products.  Further, I understand that despite careful testing and screening of blood or blood products by  collecting agencies, I may still be subject to ill effects as a result of receiving a blood transfusion and/or blood products.  The following are some, but not all, of the potential risks that can occur: fever and allergic reactions, hemolytic reactions, transmission of diseases such as Hepatitis, AIDS and Cytomegalovirus (CMV) and fluid overload.  In the event that I wish to have an autologous transfusion of my own blood, or a directed donor transfusion, I will discuss this with my physician.  Check only if Refusing Blood or Blood Products  I understand refusal of blood or blood products as deemed necessary by my physician may have serious consequences to my condition to include possible death. I hereby assume responsibility for my refusal and release the hospital, its personnel, and my physicians from any responsibility for the consequences of my refusal.          o  Refuse      5.   I authorize the use of any specimen, organs, tissues, body parts or foreign objects that may be removed from my body during the operation/procedure for diagnosis, research or teaching purposes and their subsequent disposal by hospital authorities.  I also authorize the release of specimen test results and/or written reports to my treating physician on the hospital medical staff or other referring or consulting physicians involved in my care, at the discretion of the Pathologist or my treating physician.    6.   I consent to the photographing or videotaping of the operations or procedures to be performed, including appropriate portions of my body for medical, scientific, or educational purposes, provided my identity is not revealed by the pictures or by descriptive texts accompanying them.  If the procedure has been photographed/videotaped, the surgeon will obtain the original picture, image, videotape or CD.  The hospital will not be responsible for storage, release or maintenance of the picture, image, tape or CD.    7.   I consent  to the presence of a  or observers in the operating room as deemed necessary by my physician or their designees.    8.   I recognize that in the event my procedure results in extended X-Ray/fluoroscopy time, I may develop a skin reaction.    9. If I have a Do Not Attempt Resuscitation (DNAR) order in place, that status will be suspended while in the operating room, procedural suite, and during the recovery period unless otherwise explicitly stated by me (or a person authorized to consent on my behalf). The surgeon or my attending physician will determine when the applicable recovery period ends for purposes of reinstating the DNAR order.  10. Patients having a sterilization procedure: I understand that if the procedure is successful the results will be permanent and it will therefore be impossible for me to inseminate, conceive, or bear children.  I also understand that the procedure is intended to result in sterility, although the result has not been guaranteed.   11. I acknowledge that my physician has explained sedation/analgesia administration to me including the risk and benefits I consent to the administration of sedation/analgesia as may be necessary or desirable in the judgment of my physician.    I CERTIFY THAT I HAVE READ AND FULLY UNDERSTAND THE ABOVE CONSENT TO OPERATION and/or OTHER PROCEDURE.        ____________________________________       _________________________________      ______________________________  Signature of Patient         Signature of Responsible Person        Printed Name of Responsible Person    ____________________________________      _________________________________      ______________________________       Signature of Witness          Relationship to Patient                       Date                                       Time  Patient Name: Sophia SILVA Almanzar  : 10/25/1945    Reviewed: 2024   Printed: May 8, 2025  Medical Record #: [id number] Page 1 of  2           STATEMENT OF PHYSICIAN My signature below affirms that prior to the time of the procedure; I have explained to the patient and/or his/her legal representative, the risks and benefits involved in the proposed treatment and any reasonable alternative to the proposed treatment. I have also explained the risks and benefits involved in refusal of the proposed treatment and alternatives to the proposed treatment and have answered the patient's questions. If I have a significant financial interest in a co-management agreement or a significant financial interest in any product or implant, or other significant relationship used in this procedure/surgery, I have disclosed this and had a discussion with my patient.     _______________________________________________________________ _____________________________  (Signature of Physician)                                                                                         (Date)                                   (Time)  Patient Name: Sophia Almanzar  : 10/25/1945    Reviewed: 2024   Printed: May 8, 2025  Medical Record #: [id number] Page 2 of 2

## (undated) NOTE — LETTER
Hospital Discharge Documentation    From: [facility] Hospitalist's Office  Phone: [phone]    Patient discharged time/date: 2025  4:51 PM  Patient discharge disposition:  Home or Self Care       Discharge Summary - D/C Summary        Discharge Summary signed by Erin Barry MD at 2025  7:43 AM  Version 1 of 1      Author: Erin Barry MD Service: Hospitalist Author Type: Physician    Filed: 2025  7:43 AM Date of Service: 2025 11:35 AM Status: Signed    : Erin Barry MD (Physician)           [place] HOSPITALIST  DISCHARGE SUMMARY     Sophia Almanzar Patient Status:  Inpatient    10/25/1945 MRN [id number]   Location Health system 3W/SW Attending No att. providers found   Hosp Day # 4 PCP SOPHIA DICKERSON MD     Date of Admission: 2025  Date of Discharge: 2025  Discharge Disposition: Home or Self Care    Admitting Chief Complaint:   Acute asthma (HCC) [J45.909]  Acute chest pain [R07.9]  NSTEMI (non-ST elevated myocardial infarction) ([facility]) [I21.4]    PCP: SOPHIA DICKERSON MD    Discharge Diagnosis:   Non-ST-elevation myocardial infarction.  Severe aortic stenosis        Mild asthma exacerbation.   Essential hypertension.   Severe anxiety disorder.  RLS                 History of Present Illness:   Per Dr. Yuen  Patient is a 79-year-old  female, who came into the emergency department for evaluation initially for wheezing since last night. Then, she told the ER physician she was having also chest pain. CBC was unremarkable. Troponin was 8078. EKG showed inferior T-wave ST depression and lateral ST depression. Basic metabolic panel, liver function tests, and second troponin are still pending. D-dimer was negative. Chest x-ray showed no acute findings. Started on IV nitroglycerin, aspirin, and IV heparin, and she will be taken to the catheterization lab for further investigation.           Brief Synopsis:   Non-ST-elevation myocardial  infarction.  -initial trops >8,000  -likely late presentation  -cardiology consulted, seen by Dr. Palumbo, then  underwent emergent LHC with Dr. Easton  showing: Mild to moderate disease of the left system. 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 Normal LVEDP. Mild hypokinesis of inferior wall.   -->recommend: Viability for the inferior wall, based on LV angiogram it appears that the inferior wall is viable. Would need retrograde intervention of the right coronary artery due to flush occlusion at the ostium.   -MRI pending  -ECHO abn-->EF 55-60%, mod concentric hypertrophy, a reversible restrictive pattern, indicative of decreased left ventricular diastolic compliance and/or    increased left atrial pressure - grade 3 diastolic dysfunction. RVSP is elevated, 50-55mmHg, suggestive of at least moderate pulmonary hypertension, severe low-flow low-gradient aortic stenosis.        -tele  -cont BB for now--> pt reports intermittent wheezing that she attributes to metoprolol. She takes metoprolol at home bid with occasional wheeze that is going away within hr or so.  -seems like her wheezing correlates actually with her anxiety   -s/p heparin ggt  -s/p  mg in ER (in the past no issues with ASA 81 mg, but stopped taking >10 yrs ago)  -now on plavix ordered per cardiology  -NTG prn  -reported allergy to NSAIDs (naproxen), also to ACEI (lisinopril), amlodipine, diltiazem  -abn cardiac MRI with apical infarction, however the inferior appears viable; overall LVEF is 56%.   -cardiology recommends: Continue DAPT, anti-lipid therapy, anti-anginal therapy (BB, imdur). Plan for  intervention with Dr Easton.      Headache  Seems tension type, no neuro deficits, might be related to NTG as well  -supportive     Mild asthma exacerbation.  -seen by pulmonary  -restart inhalers, short course of steroids taper with improvement  -f/u in pulmonary clinic with Dr. Stewart       Essential hypertension.   -BP ok, monitor     Severe anxiety disorder.  RLS  -cont meds, supportive     A1c 5.9     Abn LFT, mild  -repeat better     HA-->supportive     Social: lives alone     Dispo: home                    Discharge Medication List:     Discharge Medications        START taking these medications        Instructions Prescription details   aspirin 81 MG Tbec      Take 1 tablet (81 mg total) by mouth daily.   Quantity: 30 tablet  Refills: 0     clopidogrel 75 MG Tabs  Commonly known as: Plavix      Take 1 tablet (75 mg total) by mouth daily.   Quantity: 30 tablet  Refills: 0     furosemide 20 MG Tabs  Commonly known as: Lasix      Take 1 tablet (20 mg total) by mouth daily.   Quantity: 30 tablet  Refills: 0     isosorbide mononitrate ER 30 MG Tb24  Commonly known as: Imdur      Take 1 tablet (30 mg total) by mouth daily.   Quantity: 30 tablet  Refills: 0     predniSONE 20 MG Tabs  Commonly known as: Deltasone      Take 1 tablet (20 mg total) by mouth daily with breakfast.   Quantity: 4 tablet  Refills: 0            CHANGE how you take these medications        Instructions Prescription details   ALPRAZolam 0.25 MG Tabs  Commonly known as: Xanax  What changed: when to take this      Take 1 tablet (0.25 mg total) by mouth nightly as needed.   Refills: 0     Levalbuterol Tartrate 45 MCG/ACT Aero  Commonly known as: XOPENEX HFA  What changed:   when to take this  reasons to take this      Inhale 2 puffs into the lungs every 6 (six) hours as needed for Wheezing.   Refills: 0     rOPINIRole HCl 3 MG Tabs  Commonly known as: REQUIP  What changed: Another medication with the same name was removed. Continue taking this medication, and follow the directions you see here.       Refills: 0            CONTINUE taking these medications        Instructions Prescription details   Breo Ellipta 200-25 MCG/ACT Aepb  Generic drug: fluticasone furoate-vilanterol      Inhale 1 puff into the lungs daily.   Refills: 0      escitalopram 5 MG Tabs  Commonly known as: Lexapro      Take 1 tablet (5 mg total) by mouth in the morning.   Refills: 0     HYDROcodone-acetaminophen 5-325 MG Tabs  Commonly known as: Norco       Refills: 0     latanoprost 0.005 % Soln  Commonly known as: Xalatan       Refills: 0     metoprolol succinate ER 25 MG Tb24  Commonly known as: Toprol XL      Take 1 tablet (25 mg total) by mouth in the morning and 1 tablet (25 mg total) before bedtime.   Refills: 0     rosuvastatin 20 MG Tabs  Commonly known as: Crestor       Refills: 0     Valtrex 1 g Tabs  Generic drug: valACYclovir       Refills: 5            STOP taking these medications      ciprofloxacin 0.3 % Soln  Commonly known as: Ciloxan        hydroCHLOROthiazide 25 MG Tabs        mupirocin 2 % Oint  Commonly known as: Bactroban        prednisoLONE 1 % Susp  Commonly known as: Pred Forte                  Where to Get Your Medications        These medications were sent to [id number] - [address] AT [address] [facility], [phone], [phone]  [address] [facility] [address]      Phone: [phone]   aspirin 81 MG Tbec  clopidogrel 75 MG Tabs  furosemide 20 MG Tabs  isosorbide mononitrate ER 30 MG Tb24  predniSONE 20 MG Tabs         Follow-up appointment:   Albert Tristan DO  [address]  [facility] [id number] [phone]    Follow up on 5/15/2025  Thursday, May 15th @ 11AM as scheduled    Vega Stewart DO  [address]  [facility] [address]  [phone]    Schedule an appointment as soon as possible for a visit in 2 week(s)      Thania Gonzalez MD  [address] [place] [address]  [facility] [address] [phone]    Schedule an appointment as soon as possible for a visit in 1 week(s)  post hospitalization follow up      Vital signs:  Temp:  [98.1 °F (36.7 °C)-98.5 °F (36.9 °C)] 98.5 °F (36.9 °C)  Pulse:  [74-76] 74  Resp:  [18] 18  BP: (124-175)/(61-64)  127/64  SpO2:  [93 %-94 %] 93 %    Physical Exam:    General: No acute distress. Overall much better, very anxious   Respiratory: Clear to auscultation bilaterally. No wheezes. No rhonchi.  Cardiovascular: S1, S2. Regular rate and rhythm. No murmurs, rubs or gallops.   Abdomen: Soft, nontender, nondistended.  Positive bowel sounds. No rebound or guarding.  Neurologic: No new focal neurological deficits.   Musculoskeletal: Moves all extremities.  Extremities: No edema.  -----------------------------------------------------------------------------------------------  PATIENT DISCHARGE INSTRUCTIONS: See electronic chart    I d/w pt  the available results, management plan, medications changes, and discharge instructions including follow ups as outlined above.   Scripts sent to pharmacy.      Hospital Discharge Diagnoses:  NSTEMI    Lace+ Score: 66  59-90 High Risk  29-58 Medium Risk  0-28   Low Risk.    TCM Follow-Up Recommendation:  LACE > 58: High Risk of readmission after discharge from the hospital.        NORMA MUELLER MD 5/13/2025    Time spent:  > 30 minutes      Electronically signed by Norma Mueller MD on 5/13/2025  7:43 AM